# Patient Record
Sex: FEMALE | Race: WHITE | NOT HISPANIC OR LATINO | Employment: OTHER | URBAN - METROPOLITAN AREA
[De-identification: names, ages, dates, MRNs, and addresses within clinical notes are randomized per-mention and may not be internally consistent; named-entity substitution may affect disease eponyms.]

---

## 2017-02-02 PROBLEM — I50.9 ACUTE ON CHRONIC HEART FAILURE: Status: ACTIVE | Noted: 2017-02-02

## 2017-02-20 ENCOUNTER — HOSPITAL ENCOUNTER (OUTPATIENT)
Dept: RADIOLOGY | Facility: HOSPITAL | Age: 72
Discharge: HOME | End: 2017-02-20
Attending: INTERNAL MEDICINE
Payer: MEDICARE

## 2017-02-20 DIAGNOSIS — I48.0 PAROXYSMAL ATRIAL FIBRILLATION: ICD-10-CM

## 2017-02-20 PROCEDURE — 71020 XR CHEST PA AND LATERAL: CPT | Mod: 26,,, | Performed by: RADIOLOGY

## 2017-02-20 PROCEDURE — 71020 XR CHEST PA AND LATERAL: CPT | Mod: TC

## 2017-03-15 ENCOUNTER — HOSPITAL ENCOUNTER (INPATIENT)
Facility: HOSPITAL | Age: 72
LOS: 3 days | Discharge: HOME | DRG: 191 | End: 2017-03-18
Attending: EMERGENCY MEDICINE | Admitting: HOSPITALIST
Payer: MEDICARE

## 2017-03-15 DIAGNOSIS — I50.32 CHRONIC DIASTOLIC CHF (CONGESTIVE HEART FAILURE): ICD-10-CM

## 2017-03-15 DIAGNOSIS — I10 ESSENTIAL HYPERTENSION: Chronic | ICD-10-CM

## 2017-03-15 DIAGNOSIS — J44.1 COPD WITH ACUTE EXACERBATION: ICD-10-CM

## 2017-03-15 DIAGNOSIS — J44.1 ACUTE EXACERBATION OF CHRONIC OBSTRUCTIVE PULMONARY DISEASE (COPD): Primary | ICD-10-CM

## 2017-03-15 DIAGNOSIS — E03.9 HYPOTHYROIDISM, UNSPECIFIED TYPE: Chronic | ICD-10-CM

## 2017-03-15 DIAGNOSIS — K21.9 GASTROESOPHAGEAL REFLUX DISEASE, ESOPHAGITIS PRESENCE NOT SPECIFIED: Chronic | ICD-10-CM

## 2017-03-15 DIAGNOSIS — J96.11 CHRONIC RESPIRATORY FAILURE WITH HYPOXIA: Chronic | ICD-10-CM

## 2017-03-15 DIAGNOSIS — E78.5 HYPERLIPIDEMIA, UNSPECIFIED HYPERLIPIDEMIA TYPE: Chronic | ICD-10-CM

## 2017-03-15 DIAGNOSIS — J44.9 CHRONIC OBSTRUCTIVE PULMONARY DISEASE, UNSPECIFIED COPD TYPE: Chronic | ICD-10-CM

## 2017-03-15 DIAGNOSIS — R06.03 RESPIRATORY DISTRESS: ICD-10-CM

## 2017-03-15 PROBLEM — I50.9 ACUTE ON CHRONIC HEART FAILURE: Status: RESOLVED | Noted: 2017-02-02 | Resolved: 2017-03-15

## 2017-03-15 LAB
ALBUMIN SERPL BCP-MCNC: 3.5 G/DL
ALP SERPL-CCNC: 162 U/L
ALT SERPL W/O P-5'-P-CCNC: 17 U/L
ANION GAP SERPL CALC-SCNC: 10 MMOL/L
APTT BLDCRRT: 27.5 SEC
AST SERPL-CCNC: 12 U/L
BASOPHILS # BLD AUTO: 0.01 K/UL
BASOPHILS NFR BLD: 0.2 %
BILIRUB SERPL-MCNC: 1 MG/DL
BNP SERPL-MCNC: 32 PG/ML
BUN SERPL-MCNC: 16 MG/DL
CALCIUM SERPL-MCNC: 9.5 MG/DL
CHLORIDE SERPL-SCNC: 101 MMOL/L
CO2 SERPL-SCNC: 29 MMOL/L
CREAT SERPL-MCNC: 0.9 MG/DL
DIFFERENTIAL METHOD: ABNORMAL
EOSINOPHIL # BLD AUTO: 0 K/UL
EOSINOPHIL NFR BLD: 0 %
ERYTHROCYTE [DISTWIDTH] IN BLOOD BY AUTOMATED COUNT: 15.8 %
EST. GFR  (AFRICAN AMERICAN): >60 ML/MIN/1.73 M^2
EST. GFR  (NON AFRICAN AMERICAN): >60 ML/MIN/1.73 M^2
GLUCOSE SERPL-MCNC: 151 MG/DL
HCT VFR BLD AUTO: 41.3 %
HGB BLD-MCNC: 13.6 G/DL
INR PPP: 1
LYMPHOCYTES # BLD AUTO: 0.3 K/UL
LYMPHOCYTES NFR BLD: 4.8 %
MAGNESIUM SERPL-MCNC: 2.2 MG/DL
MCH RBC QN AUTO: 29.3 PG
MCHC RBC AUTO-ENTMCNC: 32.9 %
MCV RBC AUTO: 89 FL
MONOCYTES # BLD AUTO: 0.1 K/UL
MONOCYTES NFR BLD: 1.5 %
NEUTROPHILS # BLD AUTO: 5 K/UL
NEUTROPHILS NFR BLD: 93.1 %
PLATELET # BLD AUTO: 175 K/UL
PMV BLD AUTO: 12 FL
POTASSIUM SERPL-SCNC: 3.5 MMOL/L
PROT SERPL-MCNC: 6.8 G/DL
PROTHROMBIN TIME: 10.9 SEC
RBC # BLD AUTO: 4.64 M/UL
SODIUM SERPL-SCNC: 140 MMOL/L
TROPONIN I SERPL DL<=0.01 NG/ML-MCNC: 0.01 NG/ML
TROPONIN I SERPL DL<=0.01 NG/ML-MCNC: 0.01 NG/ML
WBC # BLD AUTO: 5.39 K/UL

## 2017-03-15 PROCEDURE — 83880 ASSAY OF NATRIURETIC PEPTIDE: CPT

## 2017-03-15 PROCEDURE — 36415 COLL VENOUS BLD VENIPUNCTURE: CPT

## 2017-03-15 PROCEDURE — 94640 AIRWAY INHALATION TREATMENT: CPT

## 2017-03-15 PROCEDURE — 63600175 PHARM REV CODE 636 W HCPCS: Performed by: EMERGENCY MEDICINE

## 2017-03-15 PROCEDURE — 80053 COMPREHEN METABOLIC PANEL: CPT

## 2017-03-15 PROCEDURE — 99285 EMERGENCY DEPT VISIT HI MDM: CPT

## 2017-03-15 PROCEDURE — 25000242 PHARM REV CODE 250 ALT 637 W/ HCPCS: Performed by: EMERGENCY MEDICINE

## 2017-03-15 PROCEDURE — 27000221 HC OXYGEN, UP TO 24 HOURS

## 2017-03-15 PROCEDURE — 25000003 PHARM REV CODE 250: Performed by: EMERGENCY MEDICINE

## 2017-03-15 PROCEDURE — 93005 ELECTROCARDIOGRAM TRACING: CPT

## 2017-03-15 PROCEDURE — 25000003 PHARM REV CODE 250: Performed by: INTERNAL MEDICINE

## 2017-03-15 PROCEDURE — 85730 THROMBOPLASTIN TIME PARTIAL: CPT

## 2017-03-15 PROCEDURE — 85025 COMPLETE CBC W/AUTO DIFF WBC: CPT

## 2017-03-15 PROCEDURE — 83735 ASSAY OF MAGNESIUM: CPT

## 2017-03-15 PROCEDURE — 12000002 HC ACUTE/MED SURGE SEMI-PRIVATE ROOM

## 2017-03-15 PROCEDURE — 84484 ASSAY OF TROPONIN QUANT: CPT

## 2017-03-15 PROCEDURE — 84484 ASSAY OF TROPONIN QUANT: CPT | Mod: 91

## 2017-03-15 PROCEDURE — 85610 PROTHROMBIN TIME: CPT

## 2017-03-15 PROCEDURE — 94761 N-INVAS EAR/PLS OXIMETRY MLT: CPT

## 2017-03-15 RX ORDER — BUMETANIDE 1 MG/1
1 TABLET ORAL 2 TIMES DAILY
Status: DISCONTINUED | OUTPATIENT
Start: 2017-03-15 | End: 2017-03-18 | Stop reason: HOSPADM

## 2017-03-15 RX ORDER — METOPROLOL TARTRATE 25 MG/1
25 TABLET, FILM COATED ORAL 2 TIMES DAILY
Status: DISCONTINUED | OUTPATIENT
Start: 2017-03-15 | End: 2017-03-15

## 2017-03-15 RX ORDER — METHYLPREDNISOLONE SODIUM SUCCINATE 125 MG/2ML
125 INJECTION INTRAMUSCULAR; INTRAVENOUS EVERY 8 HOURS
Status: DISCONTINUED | OUTPATIENT
Start: 2017-03-15 | End: 2017-03-16

## 2017-03-15 RX ORDER — IPRATROPIUM BROMIDE AND ALBUTEROL SULFATE 2.5; .5 MG/3ML; MG/3ML
3 SOLUTION RESPIRATORY (INHALATION) EVERY 6 HOURS PRN
Status: DISCONTINUED | OUTPATIENT
Start: 2017-03-15 | End: 2017-03-18 | Stop reason: HOSPADM

## 2017-03-15 RX ORDER — POLYETHYLENE GLYCOL 3350 17 G/17G
17 POWDER, FOR SOLUTION ORAL DAILY
Status: DISCONTINUED | OUTPATIENT
Start: 2017-03-16 | End: 2017-03-18 | Stop reason: HOSPADM

## 2017-03-15 RX ORDER — MOXIFLOXACIN HYDROCHLORIDE 400 MG/250ML
400 INJECTION, SOLUTION INTRAVENOUS
Status: DISCONTINUED | OUTPATIENT
Start: 2017-03-15 | End: 2017-03-17

## 2017-03-15 RX ORDER — ONDANSETRON 2 MG/ML
4 INJECTION INTRAMUSCULAR; INTRAVENOUS EVERY 12 HOURS PRN
Status: DISCONTINUED | OUTPATIENT
Start: 2017-03-15 | End: 2017-03-15

## 2017-03-15 RX ORDER — HYDROCODONE BITARTRATE AND ACETAMINOPHEN 5; 325 MG/1; MG/1
1 TABLET ORAL EVERY 4 HOURS PRN
Status: DISCONTINUED | OUTPATIENT
Start: 2017-03-15 | End: 2017-03-15

## 2017-03-15 RX ORDER — ALBUTEROL SULFATE 2.5 MG/.5ML
5 SOLUTION RESPIRATORY (INHALATION)
Status: COMPLETED | OUTPATIENT
Start: 2017-03-15 | End: 2017-03-15

## 2017-03-15 RX ORDER — FAMOTIDINE 20 MG/1
20 TABLET, FILM COATED ORAL 2 TIMES DAILY
Status: DISCONTINUED | OUTPATIENT
Start: 2017-03-15 | End: 2017-03-18 | Stop reason: HOSPADM

## 2017-03-15 RX ORDER — ISOSORBIDE DINITRATE 10 MG/1
30 TABLET ORAL DAILY
COMMUNITY

## 2017-03-15 RX ORDER — ROSUVASTATIN CALCIUM 10 MG/1
20 TABLET, COATED ORAL DAILY
Status: DISCONTINUED | OUTPATIENT
Start: 2017-03-16 | End: 2017-03-18 | Stop reason: HOSPADM

## 2017-03-15 RX ORDER — CLONIDINE HYDROCHLORIDE 0.1 MG/1
0.1 TABLET ORAL 3 TIMES DAILY PRN
Status: DISCONTINUED | OUTPATIENT
Start: 2017-03-15 | End: 2017-03-18 | Stop reason: HOSPADM

## 2017-03-15 RX ORDER — METHYLPREDNISOLONE SODIUM SUCCINATE 125 MG/2ML
125 INJECTION INTRAMUSCULAR; INTRAVENOUS EVERY 6 HOURS
Status: DISCONTINUED | OUTPATIENT
Start: 2017-03-16 | End: 2017-03-15

## 2017-03-15 RX ORDER — MECLIZINE HYDROCHLORIDE 25 MG/1
25 TABLET ORAL 3 TIMES DAILY PRN
Status: DISCONTINUED | OUTPATIENT
Start: 2017-03-15 | End: 2017-03-18 | Stop reason: HOSPADM

## 2017-03-15 RX ORDER — ENOXAPARIN SODIUM 100 MG/ML
40 INJECTION SUBCUTANEOUS EVERY 24 HOURS
Status: DISCONTINUED | OUTPATIENT
Start: 2017-03-16 | End: 2017-03-18 | Stop reason: HOSPADM

## 2017-03-15 RX ORDER — DOXYCYCLINE HYCLATE 100 MG
100 TABLET ORAL 2 TIMES DAILY
Status: ON HOLD | COMMUNITY
End: 2017-03-18 | Stop reason: HOSPADM

## 2017-03-15 RX ORDER — METOPROLOL TARTRATE 25 MG/1
12.5 TABLET ORAL 2 TIMES DAILY
Status: DISCONTINUED | OUTPATIENT
Start: 2017-03-16 | End: 2017-03-18 | Stop reason: HOSPADM

## 2017-03-15 RX ORDER — IPRATROPIUM BROMIDE AND ALBUTEROL SULFATE 2.5; .5 MG/3ML; MG/3ML
3 SOLUTION RESPIRATORY (INHALATION) EVERY 4 HOURS
Status: DISCONTINUED | OUTPATIENT
Start: 2017-03-15 | End: 2017-03-16

## 2017-03-15 RX ORDER — LEVOTHYROXINE SODIUM 50 UG/1
50 TABLET ORAL
Status: DISCONTINUED | OUTPATIENT
Start: 2017-03-16 | End: 2017-03-18 | Stop reason: HOSPADM

## 2017-03-15 RX ORDER — MONTELUKAST SODIUM 10 MG/1
10 TABLET ORAL DAILY
Status: DISCONTINUED | OUTPATIENT
Start: 2017-03-16 | End: 2017-03-18 | Stop reason: HOSPADM

## 2017-03-15 RX ORDER — ACETAMINOPHEN 500 MG
500 TABLET ORAL EVERY 6 HOURS PRN
Status: DISCONTINUED | OUTPATIENT
Start: 2017-03-15 | End: 2017-03-18 | Stop reason: HOSPADM

## 2017-03-15 RX ORDER — RAMELTEON 8 MG/1
8 TABLET ORAL NIGHTLY PRN
Status: DISCONTINUED | OUTPATIENT
Start: 2017-03-15 | End: 2017-03-18 | Stop reason: HOSPADM

## 2017-03-15 RX ADMIN — MOXIFLOXACIN HYDROCHLORIDE 400 MG: 400 INJECTION, SOLUTION INTRAVENOUS at 09:03

## 2017-03-15 RX ADMIN — ALBUTEROL SULFATE 5 MG: 2.5 SOLUTION RESPIRATORY (INHALATION) at 02:03

## 2017-03-15 RX ADMIN — BUMETANIDE 1 MG: 1 TABLET ORAL at 09:03

## 2017-03-15 RX ADMIN — FAMOTIDINE 20 MG: 20 TABLET, FILM COATED ORAL at 09:03

## 2017-03-15 RX ADMIN — METOPROLOL TARTRATE 25 MG: 25 TABLET ORAL at 09:03

## 2017-03-15 RX ADMIN — ISOSORBIDE DINITRATE 30 MG: 20 TABLET ORAL at 11:03

## 2017-03-15 NOTE — ED PROVIDER NOTES
"Encounter Date: 3/15/2017    SCRIBE #1 NOTE: I, Elliotthimanshu Hernandez, am scribing for, and in the presence of, Kamlesh Robles MD. Other sections scribed: HPI, ROS.       History     Chief Complaint   Patient presents with    Shortness of Breath     arrived via [facility], called to home for c/o SOB since approx 1 wk ago, reports worsening SOB on exertion, EMS reports on home oxygen     Cough     EMS reports pt c/o productive cough, yellow sputum     Review of patient's allergies indicates:   Allergen Reactions    Contrast media Other (See Comments)     Nephropathy      Potassium Swelling    Clindamycin Rash     HPI Comments: CC: Shortness of Breath  HPI: This 72 y.o. female with COPD, CHF, O2 dependence, HTN, hypothyroidism, GERD and Hx of A-Fib s/p ablation presents to the ED via EMS c/o worsening of her chronic SOB today. Pt states that she quickly becomes out of breath with exertion. She reports associated postnasal drip, infrequent cough (yellow sputum), and "tightness" to lower sternal area worse with deep breathes. Pt reports taking Albuterol and Duoneb treatments earlier today, but denies much improvement of her SOB with this. EMS reports initial SpO2 80% on 2L NC, rising to 97% once pt got nebulizer. Family states pt has been O2 dependent for the past 2-3 months, but pt has been on home O2 for much longer. Pt states that she was recently taken off of her Coumadin due ot a blood clot in her L eye. Pt states that Dr. Owens placed her on 3 day Prednisone regimen and Doxycyline during appointment yesterday. Pt denies fever, abdominal pain, chest pain, syncope.      The history is provided by the patient.     Past Medical History:   Diagnosis Date    Acute CHF     Asthma     COPD (chronic obstructive pulmonary disease)     Essential hypertension 8/20/2016    GERD (gastroesophageal reflux disease)     Hiatal hernia     Thyroid disease      Past Surgical History:   Procedure Laterality Date    " GALLBLADDER SURGERY      heart ablation       History reviewed. No pertinent family history.  Social History   Substance Use Topics    Smoking status: Former Smoker     Quit date: 8/8/2004    Smokeless tobacco: None    Alcohol use No     Review of Systems   Constitutional: Negative for chills and fever.   HENT: Positive for postnasal drip. Negative for congestion, rhinorrhea and sore throat.    Eyes: Negative for pain and visual disturbance.   Respiratory: Positive for cough (yellow sputum), chest tightness and shortness of breath.    Cardiovascular: Negative for chest pain.   Gastrointestinal: Negative for nausea and vomiting.   Genitourinary: Negative for difficulty urinating and dysuria.   Musculoskeletal: Negative for arthralgias and myalgias.   Skin: Negative for rash and wound.   Neurological: Negative for headaches.       Physical Exam   Initial Vitals   BP Pulse Resp Temp SpO2   03/15/17 1316 03/15/17 1316 03/15/17 1316 -- 03/15/17 1316   129/93 113 27  98 %     Physical Exam  The patient was examined specifically for the following:   General:No significant distress, Good color, Warm and dry. Head and neck:Scalp atraumatic, Neck supple. Neurological:Appropriate conversation, Gross motor deficits. Eyes:Conjugate gaze, Clear corneas. ENT: No epistaxis. Cardiac: Regular rate and rhythm, Grossly normal heart tones. Pulmonary: Wheezing, Rales. Gastrointestinal: Abdominal tenderness, Abdominal distention. Musculoskeletal: Extremity deformity, Apparent pain with range of motion of the joints. Skin: Rash.   The findings on examination were normal except for the following: The patient has distant breath sounds.  The heart rate is 113.  The patient's oxygen saturations are 94% at rest, in the stretcher, on 2 L nasal cannula.  I hear no leo wheezing or rales.  Patient's respiratory rate is 27.   ED Course   Critical Care  Date/Time: 3/16/2017 2:20 PM  Performed by: MAGDALENA DE LOS SANTOS  Authorized by: CLIFTON HILLMAN  LEOBARDO   Direct patient critical care time: 25 minutes  Additional history critical care time: 11 minutes  Ordering / reviewing critical care time: 9 minutes  Documentation critical care time: 17 minutes  Consulting other physicians critical care time: 5 minutes  Total critical care time (exclusive of procedural time) : 67 minutes  Critical care time was exclusive of separately billable procedures and treating other patients and teaching time.  Critical care was necessary to treat or prevent imminent or life-threatening deterioration of the following conditions: respiratory failure.  Critical care was time spent personally by me on the following activities: development of treatment plan with patient or surrogate, examination of patient, ordering and review of laboratory studies, re-evaluation of patient's condition, pulse oximetry, ordering and performing treatments and interventions, evaluation of patient's response to treatment, discussions with primary provider, review of old charts, ordering and review of radiographic studies and obtaining history from patient or surrogate.        Labs Reviewed   COMPREHENSIVE METABOLIC PANEL - Abnormal; Notable for the following:        Result Value    Glucose 151 (*)     Alkaline Phosphatase 162 (*)     All other components within normal limits   CBC W/ AUTO DIFFERENTIAL - Abnormal; Notable for the following:     RDW 15.8 (*)     Lymph # 0.3 (*)     Mono # 0.1 (*)     Gran% 93.1 (*)     Lymph% 4.8 (*)     Mono% 1.5 (*)     All other components within normal limits   PROTIME-INR   APTT   TROPONIN I   B-TYPE NATRIURETIC PEPTIDE   MAGNESIUM     EKG Readings: (Independently Interpreted)   This patient is in a sinus tachycardia with a heart rate of 111.  The DE QRS and QT intervals are normal.  There is no definite evidence of acute myocardial infarction or malignant arrhythmia.       X-Rays:   Independently Interpreted Readings:   Other Readings:  Chest x-ray fails to reveal evidence  of pulmonary edema pneumonia and pneumothorax.    Medical decision making: Given the above, this patient presented emergency room with a history of severe COPD.  Oxygen saturations on home 2 L nasal cannula are 93%.  When the patient exerts herself just a little bit, for instance to sit and urinate.  Oxygen saturations dropped to 88%.  The chest x-ray is negative for pneumothorax pneumonia pleural effusion.  Troponin and BNP are negative.  I doubt myocardial infarction.  The patient is using her oxygen much more than at baseline.  She is coughing up yellow sputum.  She is failing treatment with doxycycline and prednisone.  I'll admit her for IV steroids antibiotic therapy, oxygen therapy and consultation with pulmonology.  I will discuss this case with .              Scribe Attestation:   Scribe #1: I performed the above scribed service and the documentation accurately describes the services I performed. I attest to the accuracy of the note.    Attending Attestation:           Physician Attestation for Scribe:  Physician Attestation Statement for Scribe #1: I, Kamlesh Robles MD, reviewed documentation, as scribed by Elliott Hernandez in my presence, and it is both accurate and complete.                 ED Course     Clinical Impression:   The primary encounter diagnosis was Acute exacerbation of chronic obstructive pulmonary disease (COPD). A diagnosis of Respiratory distress was also pertinent to this visit.          Kamlesh Robles MD  03/16/17 [id number]

## 2017-03-15 NOTE — ED TRIAGE NOTES
Brought by EMS for SOB which has been worsening for the past week. On O2 at home 2LPM NC. EMS stated that her Sat was in the 80's when they picked her up. Currently 93% on 2 LPM. Leg cramps. Coughing up yellow sputum. AAO X3

## 2017-03-15 NOTE — IP AVS SNAPSHOT
[place] [address] [place] [address]  Phone: [phone]           Patient Discharge Instructions     Our goal is to set you up for success. This packet includes information on your condition, medications, and your home care. It will help you to care for yourself so you don't get sicker and need to go back to the hospital.     Please ask your nurse if you have any questions.        There are many details to remember when preparing to leave the hospital. Here is what you will need to do:    1. Take your medicine. If you are prescribed medications, review your Medication List in the following pages. You may have new medications to  at the pharmacy and others that you'll need to stop taking. Review the instructions for how and when to take your medications. Talk with your doctor or nurses if you are unsure of what to do.     2. Go to your follow-up appointments. Specific follow-up information is listed in the following pages. Your may be contacted by a transition nurse or clinical provider about future appointments. Be sure we have all of the phone numbers to reach you, if needed. Please contact your provider's office if you are unable to make an appointment.     3. Watch for warning signs. Your doctor or nurse will give you detailed warning signs to watch for and when to call for assistance. These instructions may also include educational information about your condition. If you experience any of warning signs to your health, call your doctor.               [facility]  Unless otherwise directed by your provider, please contact [facility], our nurse care line that is available for 24/7 assistance.     [phone] (toll-free)    Registered nurses in the [facility] provide clinical advisement, health education, appointment booking, and other advisory services.                    ** Verify the list of medication(s) below is accurate and up to date.  Carry this with you in case of emergency. If your medications have changed, please notify your healthcare provider.             Medication List      START taking these medications        Additional Info                      acetylcysteine 200 mg/ml (20%) 200 mg/mL (20 %) nebulizer solution   Commonly known as:  MUCOMYST   Quantity:  15 vial   Refills:  11   Dose:  2 mL    Last time this was given:  2 mLs on 3/18/2017  4:03 PM   Instructions:  Take 2 mLs by nebulization 3 (three) times daily as needed.     Begin Date    AM    Noon    PM    Bedtime       amoxicillin-clavulanate 875-125mg 875-125 mg per tablet   Commonly known as:  AUGMENTIN   Quantity:  14 tablet   Refills:  0   Dose:  1 tablet    Instructions:  Take 1 tablet by mouth 2 (two) times daily.     Begin Date    AM    Noon    PM    Bedtime       fluticasone 50 mcg/actuation nasal spray   Commonly known as:  FLONASE   Quantity:  3 g   Refills:  11   Dose:  2 spray    Last time this was given:  2 sprays on 3/18/2017 10:21 AM   Instructions:  2 sprays by Each Nare route once daily.     Begin Date    AM    Noon    PM    Bedtime         CONTINUE taking these medications        Additional Info                      ALBUTEROL INHL   Refills:  0    Instructions:  Inhale into the lungs.     Begin Date    AM    Noon    PM    Bedtime       albuterol-ipratropium 2.5mg-0.5mg/3mL 0.5 mg-3 mg(2.5 mg base)/3 mL nebulizer solution   Commonly known as:  DUO-NEB   Quantity:  3 vial   Refills:  12   Dose:  3 mL    Last time this was given:  3 mLs on 3/18/2017  4:03 PM   Instructions:  Take 3 mLs by nebulization every 4 (four) hours. Rescue     Begin Date    AM    Noon    PM    Bedtime       bumetanide 1 MG tablet   Commonly known as:  BUMEX   Refills:  0   Dose:  1 mg    Last time this was given:  1 mg on 3/18/2017 10:12 AM   Instructions:  Take 1 mg by mouth 2 (two) times daily.     Begin Date    AM    Noon    PM    Bedtime       isosorbide dinitrate 10 MG tablet   Commonly  known as:  ISORDIL   Refills:  0   Dose:  30 mg    Last time this was given:  30 mg on 3/17/2017  9:08 PM   Instructions:  Take 30 mg by mouth once daily.     Begin Date    AM    Noon    PM    Bedtime       levothyroxine 75 MCG tablet   Commonly known as:  SYNTHROID   Refills:  0   Dose:  50 mcg    Last time this was given:  50 mcg on 3/18/2017  6:01 AM   Instructions:  Take 50 mcg by mouth once daily.     Begin Date    AM    Noon    PM    Bedtime       meclizine 32 MG tablet   Commonly known as:  ANTIVERT   Refills:  0   Dose:  12.5 mg    Instructions:  Take 12.5 mg by mouth as needed.     Begin Date    AM    Noon    PM    Bedtime       metoprolol tartrate 25 MG tablet   Commonly known as:  LOPRESSOR   Refills:  0   Dose:  25 mg    Last time this was given:  12.5 mg on 3/18/2017 10:11 AM   Instructions:  Take 25 mg by mouth 2 (two) times daily.     Begin Date    AM    Noon    PM    Bedtime       montelukast 10 mg tablet   Commonly known as:  SINGULAIR   Quantity:  30 tablet   Refills:  3   Dose:  10 mg    Last time this was given:  10 mg on 3/18/2017 10:12 AM   Instructions:  Take 1 tablet (10 mg total) by mouth once daily.     Begin Date    AM    Noon    PM    Bedtime       predniSONE 20 MG tablet   Commonly known as:  DELTASONE   Quantity:  12 tablet   Refills:  0    Instructions:  Take 3 tabs for 2 days, then 2 tabs for 2 days, then 1 tab for 2 days, then stop.     Begin Date    AM    Noon    PM    Bedtime       ranitidine 300 MG capsule   Commonly known as:  ZANTAC   Refills:  0   Dose:  300 mg    Instructions:  Take 300 mg by mouth every evening.     Begin Date    AM    Noon    PM    Bedtime       rosuvastatin 10 MG tablet   Commonly known as:  CRESTOR   Refills:  0   Dose:  20 mg    Last time this was given:  20 mg on 3/18/2017 10:11 AM   Instructions:  Take 20 mg by mouth once daily.     Begin Date    AM    Noon    PM    Bedtime         STOP taking these medications     doxycycline 100 MG tablet   Commonly  known as:  VIBRA-TABS            Where to Get Your Medications      You can get these medications from any pharmacy     Bring a paper prescription for each of these medications     acetylcysteine 200 mg/ml (20%) 200 mg/mL (20 %) nebulizer solution    amoxicillin-clavulanate 875-125mg 875-125 mg per tablet    fluticasone 50 mcg/actuation nasal spray                  Please bring to all follow up appointments:    1. A copy of your discharge instructions.  2. All medicines you are currently taking in their original bottles.  3. Identification and insurance card.    Please arrive 15 minutes ahead of scheduled appointment time.    Please call 24 hours in advance if you must reschedule your appointment and/or time.        Follow-up Information     Schedule an appointment as soon as possible for a visit with Patrice Owens MD.    Specialty:  Internal Medicine    Why:  As needed    Contact information:    [address]  [facility] [address] [phone]          Go to David Mcclain MD.    Specialty:  Pulmonary Disease    Why:  as scheduled for PFTs    Contact information:    [address] [place] [address] [address]  [place] [address]  [phone]          Discharge Instructions     Future Orders    Activity as tolerated     Call MD for:  difficulty breathing or increased cough     Call MD for:  persistent dizziness, light-headedness, or visual disturbances     Call MD for:  temperature >100.4     Diet Cardiac         Primary Diagnosis     Your primary diagnosis was:  Chronic Bronchitis      Admission Information     Date & Time Provider Department CSN    3/15/2017  1:15 PM Karlie Andrade MD [facility] [id number]      Care Providers     Provider Role Specialty Primary office phone    Karlie Andrade MD Attending Provider Hospitalist [phone]      Important Medicare Message          Most Recent Value    Important Message from Medicare Regarding Discharge Appeal Rights  Given to patient/caregiver,  "Explained to patient/caregiver, Signed/date by patient/caregiver yes 03/17/2017 1149      Your Vitals Were     BP Pulse Temp Resp Height Weight    138/75 (BP Location: Right arm, Patient Position: Sitting, BP Method: Automatic) 97 98.3 °F (36.8 °C) (Oral) 20 5' 3" (1.6 m) 76.4 kg (168 lb 6.4 oz)    Last Period SpO2 BMI          (LMP Unknown) 98% 29.83 kg/m2        Recent Lab Values     No lab values to display.      Allergies as of 3/18/2017        Reactions    Contrast Media Other (See Comments)    Nephropathy    Potassium Swelling    Clindamycin Rash      Advance Directives     An advance directive is a document which, in the event you are no longer able to make decisions for yourself, tells your healthcare team what kind of treatment you do or do not want to receive, or who you would like to make those decisions for you.  If you do not currently have an advance directive, [facility] encourages you to create one.  For more information call:  [phone]), [phone] ([phone]),  or log on to [email or web address].        Language Assistance Services     ATTENTION: Language assistance services are available, free of charge. Please call [phone].      ATENCIÓN: Si habla español, tiene a gan disposición servicios gratuitos de asistencia lingüística. Llame al [phone].     [facility] Ý: N?u b?n nói Ti?ng Vi?t, có các d?ch v? h? tr? ngôn ng? mi?n phí dành cho b?n. G?i s? [phone].        Heart Failure Education       Heart Failure: Being Active  You have a condition called heart failure. Being active doesnt mean that you have to wear yourself out. Even a little movement each day helps to strengthen your heart. If you cant get out to exercise, you can do simple stretching and strengthening exercises at home. These are good ways to keep you well-conditioned and prevent you and your heart from becoming excessively weak.    Ideas to get you started  · Add a little movement to things you do " now. Walk to mail letters. Park your car at the far end of the parking lot and walk to the store. Walk up a flight of stairs instead of taking the elevator.  · Choose activities you enjoy. You might walk, swim, or ride an exercise bike. Things like gardening and washing the car count, too. Other possibilities include: washing dishes, walking the dog, walking around the mall, and doing aerobic activities with friends.  · Join a group exercise program at a [facility] or [facility], a senior center, or a community center. Or look into a hospital cardiac rehabilitation program. Ask your doctor if you qualify.  Tips to keep you going  · Get up and get dressed each day. Go to a coffee shop and read a newspaper or go somewhere that you'll be in the presence of other active people. Youll feel more like being active.  · Make a plan. Choose one or more activities that you enjoy and that you can easily do. Then plan to do at least one each day. You might write your plan on a calendar.  · Go with a friend or a group if you like company. This can help you feel supported and stay motivated, too.  · Plan social events that you enjoy. This will keep you mentally engaged as well as physically motivated to do things you find pleasure in.  For your safety  · Talk with your healthcare provider before starting an exercise program.  · Exercise indoors when its too hot or too cold outside, or when the air quality is poor. Try walking at a shopping mall.  · Wear socks and sturdy shoes to maintain your balance and prevent falls.  · Start slowly. Do a few minutes several times a day at first. Increase your time and speed little by little.  · Stop and rest whenever you feel tired or get short of breath.  · Dont push yourself on days when you dont feel well.  Date Last Reviewed: 3/20/2016  © 1233-6900 [employer]. [address] [place] [address], [place] [address]. All rights reserved. This information is not intended as a substitute for  professional medical care. Always follow your healthcare professional's instructions.              Heart Failure: Evaluating Your Heart  You have a condition called heart failure. To evaluate your condition, your doctor will examine you, ask questions, and do some tests. Along with looking for signs of heart failure, the doctor looks for any other health problems that may have led to heart failure. The results of your evaluation will help your doctor form a treatment plan.  Health history and physical exam  Your visit will start with a health history. Tell the doctor about any symptoms youve noticed and about all medicines you take. Then youll have a physical exam. This includes listening to your heartbeat and breathing. Youll also be checked for swelling (edema) in your legs and neck. When you have fluid buildup or fluid in the lungs, it may be called congestive heart failure.  Diagnosing heart failure     During an echocardiogram, sound waves bounce off the heart. These are converted into a picture on the screen.   The following may be done to help your doctor form a diagnosis:  · X-rays show the size and shape of your heart. These pictures can also show fluid in your lungs.  · An electrocardiogram (ECG or EKG) shows the pattern of your heartbeat. Small pads (electrodes) are placed on your chest, arms, and legs. Wires connect the pads to the ECG machine, which records your hearts electrical signals. This can give the doctor information about heart function.  · An echocardiogram uses ultrasound waves to show the structure and movement of your heart muscle. This shows how well the heart pumps. It also shows the thickness of the heart walls, and if the heart is enlarged. It is one of the most useful, non-invasive tests as it provides information about the heart's general function. This helps your doctor make treatment decisions.  · Lab tests evaluate small amounts of blood or urine for signs of problems. A BNP  lab test can help diagnose and evaluate heart failure. BNP stands for B-type natriuretic peptide. The ventricles secrete more BNP when heart failure worsens. Lab tests can also provide information about metabolic dysfunction or heart dysfunction.  Your treatment plan  Based on the results of your evaluation and tests, your doctor will develop a treatment plan. This plan is designed to relieve some of your heart failure symptoms and help make you more comfortable. Your treatment plan may include:  · Medicine to help your heart work better and improve your quality of life  · Changes in what you eat and drink to help prevent fluid from backing up in your body  · Daily monitoring of your weight and heart failure symptoms to see how well your treatment plan is working  · Exercise to help you stay healthy  · Help with quitting smoking  · Emotional and psychological support to help adjust to the changes  · Referrals to other specialists to make sure you are being treated comprehensively  Date Last Reviewed: 3/21/2016  © 5772-4405 [employer]. [address] [place] [address]. All rights reserved. This information is not intended as a substitute for professional medical care. Always follow your healthcare professional's instructions.              Heart Failure: Making Changes to Your Diet  You have a condition called heart failure. When you have heart failure, excess fluid is more likely to build up in your body because your heart isn't working well. This makes the heart work harder to pump blood. Fluid buildup causes symptoms such as shortness of breath and swelling (edema). This is often referred to as congestive heart failure or CHF. Controlling the amount of salt (sodium) you eat may help stop fluid from building up. Your doctor may also tell you to reduce the amount of fluid you drink.  Reading food labels    Your healthcare provider will tell you how much sodium you can eat each day. Read food  labels to keep track. Keep in mind that certain foods are high in salt. These include canned, frozen, and processed foods. Check the amount of sodium in each serving. Watch out for high-sodium ingredients. These include MSG (monosodium glutamate), baking soda, and sodium phosphate.   Eating less salt  Give yourself time to get used to eating less salt. It may take a little while. Here are some tips to help:  · Take the saltshaker off the table. Replace it with salt-free herb mixes and spices.  · Eat fresh or plain frozen vegetables. These have much less salt than canned vegetables.  · Choose low-sodium snacks like sodium-free pretzels, crackers, or air-popped popcorn.  · Dont add salt to your food when youre cooking. Instead, season your foods with pepper, lemon, garlic, or onion.  · When you eat out, ask that your food be cooked without added salt.  · Avoid eating fried foods as these often have a great deal of salt.  If youre told to limit fluids  You may need to limit how much fluid you have to help prevent swelling. This includes anything that is liquid at room temperature, such as ice cream and soup. If your doctor tells you to limit fluid, try these tips:  · Measure drinks in a measuring cup before you drink them. This will help you meet daily goals.  · Chill drinks to make them more refreshing.  · Suck on frozen lemon wedges to quench thirst.  · Only drink when youre thirsty.  · Chew sugarless gum or suck on hard candy to keep your mouth moist.  · Weigh yourself daily to know if your body's fluid content is rising.  My sodium goal  Your healthcare provider may give you a sodium goal to meet each day. This includes sodium found in food as well as salt that you add. My goal is to eat no more than ___________ mg of sodium per day.     When to call your doctor  Call your doctor right away if you have any symptoms of worsening heart failure. These can include:  · Sudden weight gain  · Increased swelling of  your legs or ankles  · Trouble breathing when youre resting or at night  · Increase in the number of pillows you have to sleep on  · Chest pain, pressure, discomfort, or pain in the jaw, neck, or back   Date Last Reviewed: 3/21/2016  © 3597-5896 [employer]. [address] [place] [address]. All rights reserved. This information is not intended as a substitute for professional medical care. Always follow your healthcare professional's instructions.              Heart Failure: Medicines to Help Your Heart    You have a condition called heart failure (also known as congestive heart failure, or CHF). Your doctor will likely prescribe medicines for heart failure and any underlying health problems you have. Most heart failure patients take one or more types of medicinen. Your healthcare provider will work to find the combination of medicines that works best for you.  Heart failure medicines  Here are the most common heart failure medicines:  · ACE inhibitors lower blood pressure and decrease strain on the heart. This makes it easier for the heart to pump. Angiotensin receptor blockers have similar effects. These are prescribed for some patients instead of ACE inhibitors.  · Beta-blockers relieve stress on the heart. They also improve symptoms. They may also improve the heart's pumping action over time.  · Diuretics (also called water pills) help rid your body of excess water. This can help rid your body of swelling (edema). Having less fluid to pump means your heart doesnt have to work as hard. Some diuretics make your body lose a mineral called potassium. Your doctor will tell you if you need to take supplements or eat more foods high in potassium.  · Digoxin helps your heart pump with more strength. This helps your heart pump more blood with each beat. So, more oxygen-rich blood travels to the rest of the body.  · Aldosterone antagonists help alter hormones and decrease strain on the  heart.  · Hydralazine and nitrates are two separate medicines used together to treat heart failure. They may come in one combination pill. They lower blood pressure and decrease how hard the heart has to pump.  Medicines for related conditions  Controlling other heart problems helps keep heart failure under control, too. Depending on other heart problems you have, medicines may be prescribed to:  · Lower blood pressure (antihypertensives).  · Lower cholesterol levels (statins).  · Prevent blood clots (anticoagulants or aspirin).  · Keep the heartbeat steady (antiarrhythmics).  Date Last Reviewed: 3/5/2016  © 5642-4641 [employer]. [address] [place] [address], [place] [address]. All rights reserved. This information is not intended as a substitute for professional medical care. Always follow your healthcare professional's instructions.              Heart Failure: Procedures That May Help    The heart is a muscle that pumps oxygen-rich blood to all parts of the body. When you have heart failure, the heart is not able to pump as well as it should. Blood and fluid may back up into the lungs (congestive heart failure), and some parts of the body dont get enough oxygen-rich blood to work normally. These problems lead to the symptoms of heart failure.     Certain procedures may help the heart pump better in some cases of heart failure. Some procedures are done to treat health problems that may have caused the heart failure such as coronary artery disease or heart rhythm problems. For more serious heart failure, other options are available.  Treating artery and valve problems  If you have coronary artery disease or valve disease, procedures may be done to improve blood flow. This helps the heart pump better, which can improve heart failure symptoms. First, your doctor may do a cardiac catheterization to help detect clogged blood vessels or valve damage. During this procedure, a  thin tube (catheter) in inserted  into a blood vessel and guided to the heart. There a dye is injected and a special type of X-ray (angiogram) is taken of the blood vessels. Procedures to open a blocked artery or fix damaged valves can also be done using catheterization.  · Angioplasty uses a balloon-tipped instrument at the end of the catheter. The balloon is inflated to widen the narrowed artery. In many cases, a stent is expanded to further support the narrowed artery. A stent is a metal mesh tube.  · Valve surgery repairs or replacement of faulty valves can also be done during catheterization so blood can flow properly through the chambers of the heart.  Bypass surgery is another option to help treat blocked arteries. It uses a healthy blood vessel from elsewhere in the body. The healthy blood vessel is attached above and below the blocked area so that blood can flow around the blocked artery.  Treating heart rhythm problems  A device may be placed in the chest to help a weak heart maintain a healthy, heartbeat so the heart can pump more effectively:  · Pacemaker. A pacemaker is an implanted device that regulates your heartbeat electronically. It monitors your heart's rhythm and generates a painless electric impulse that helps the heart beat in a regular rhythm. A pacemaker is programmed to meet your specific heart rhythm needs.  · Biventricular pacing/cardiac resynchronization therapy. A type of pacemaker that paces both pumping chambers of the heart at the same time to coordinate contractions and to improve the heart's function. Some people with heart failure are candidates for this therapy.  · Implantable cardioverter defibrillator. A device similar to a pacemaker that senses when the heart is beating too fast and delivers an electrical shock to convert the fast rhythm to a normal rhythm. This can be a life saving device.  In severe cases  In more serious cases of heart failure when other treatments no longer work, other options may  include:  · Ventricular assist devices (VADs). These are mechanical devices used to take over the pumping function for one or both of the heart's ventricles, or pumping chambers. A VAD may be necessary when heart failure progresses to the point that medicines and other treatments no longer help. In some cases, a VAD may be used as a bridge to transplant.  · Heart transplant. This is replacing the diseased heart with a healthy one from a donor. This is an option for a few people who are very sick. A heart transplant is very serious and not an option for all patients. Your doctor can tell you more.  Date Last Reviewed: 3/20/2016  © 3186-8088 [employer]. [address] [place] [address], [place] [address]. All rights reserved. This information is not intended as a substitute for professional medical care. Always follow your healthcare professional's instructions.              Heart Failure: Tracking Your Weight  You have a condition called heart failure. When you have heart failure, a sudden weight gain or a steady rise in weight is a warning sign that your body is retaining too much water and salt. This could mean your heart failure is getting worse. If left untreated, it can cause problems for your lungs and result in shortness of breath. Weighing yourself each day is the best way to know if youre retaining water. If your weight goes up quickly, call your doctor. You will be given instructions on how to get rid of the excess water. You will likely need medicines and to avoid salt. This will help your heart work better.  Call your doctor if you gain more than 2 pounds in 1 day, more than 5 pounds in 1 week, or whatever weight gain you were told to report by your doctor. This is often a sign of worsening heart failure and needs to be evaluated and treated. Your doctor will tell you what to do next.   Tips for weighing yourself    · Weigh yourself at the same time each morning, wearing the same clothes. Weigh  yourself after urinating and before eating.  · Use the same scale each day. Make sure the numbers are easy to read. Put the scale on a flat, hard surface -- not on a rug or carpet.  · Do not stop weighing yourself. If you forget one day, weigh again the next morning.  How to use your weight chart  · Keep your weight chart near the scale. Write your weight on the chart as soon as you get off the scale.  · Fill in the month and the start date on the chart. Then write down your weight each day. Your chart will look like this:    · If you miss a day, leave the space blank. Weigh yourself the next day and write your weight in the next space.  · Take your weight chart with you when you go to see your doctor.  Date Last Reviewed: 3/20/2016  © 7476-7839 [employer]. [address] [place] [address]. All rights reserved. This information is not intended as a substitute for professional medical care. Always follow your healthcare professional's instructions.              Heart Failure: Warning Signs of a Flare-Up  You have a condition called heart failure. Once you have heart failure, flare-ups can happen. Below are signs that can mean your heart failure is getting worse. If you notice any of these warning signs, call your healthcare provider.  Swelling    · Your feet, ankles, or lower legs get puffier.  · You notice skin changes on your lower legs.  · Your shoes feel too tight.  · Your clothes are tighter in the waist.  · You have trouble getting rings on or off your fingers.  Shortness of breath  · You have to breathe harder even when youre doing your normal activities or when youre resting.  · You are short of breath walking up stairs or even short distances.  · You wake up at night short of breath or coughing.  · You need to use more pillows or sit up to sleep.  · You wake up tired or restless.  Other warning signs  · You feel weaker, dizzy, or more tired.  · You have chest pain or changes in your  heartbeat.  · You have a cough that wont go away.  · You cant remember things or dont feel like eating.  Tracking your weight  Gaining weight is often the first warning sign that heart failure is getting worse. Gaining even a few pounds can be a sign that your body is retaining excess water and salt. Weighing yourself each day in the morning after you urinate and before you eat, is the best way to know if you're retaining water. Get a scale that is easy to read and make sure you wear the same clothes and use the same scale every time you weigh. Your healthcare provider will show you how to track your weight. Call your doctor if you gain more than 2 pounds in 1 day, 5 pounds in 1 week, or whatever weight gain you were told to report by your doctor. This is often a sign of worsening heart failure and needs to be evaluated and treated before it compromises your breathing. Your doctor will tell you what to do next.    Date Last Reviewed: 3/15/2016  © 5782-5507 [employer]. [address] [place] [address]. All rights reserved. This information is not intended as a substitute for professional medical care. Always follow your healthcare professional's instructions.              [facility] Sign-Up     Activating your [facility] account is as easy as 1-2-3!     1) Visit [email or web address], select Sign Up Now, enter this activation code and your date of birth, then select Next.  [email or web address]  Expires: 3/20/2017  3:32 PM      2) Create a username and password to use when you visit [facility] in the future and select a security question in case you lose your password and select Next.    3) Enter your e-mail address and click Sign Up!    Additional Information  If you have questions, please e-mail [email or web address] or call [phone] to talk to our [facility] staff. Remember, [facility] is NOT to be used for urgent needs. For medical emergencies, dial 911.          [facility]  complies with applicable Federal civil rights laws and does not discriminate on the basis of race, color, national origin, age, disability, or sex.

## 2017-03-15 NOTE — ED PROVIDER NOTES
"Encounter Date: 3/15/2017    SCRIBE #1 NOTE: I, Elliott Hernandez, am scribing for, and in the presence of, Kamlesh Robles MD. Other sections scribed: HPI, ROS.       History     Chief Complaint   Patient presents with    Shortness of Breath     arrived via [facility], called to home for c/o SOB since approx 1 wk ago, reports worsening SOB on exertion, EMS reports on home oxygen     Cough     EMS reports pt c/o productive cough, yellow sputum     Review of patient's allergies indicates:   Allergen Reactions    Contrast media Other (See Comments)     Nephropathy      Potassium Swelling    Clindamycin Rash     HPI Comments: CC: Shortness of Breath  HPI: This 72 y.o. female with COPD, CHF, O2 dependence, HTN, hypothyroidism, GERD and Hx of A-Fib s/p ablation presents to the ED via EMS c/o worsening of her chronic SOB today. Pt states that she quickly becomes out of breath with exertion. She reports associated postnasal drip, infrequent cough (yellow sputum), and "tightness" to lower sternal area worse with deep breathes. Pt reports taking Albuterol and Duoneb treatments earlier today, but denies much improvement of her SOB with this. EMS reports initial SpO2 80% on 2L NC, rising to 97% once pt got nebulizer. Family states pt has been O2 dependent for the past 2-3 months, but pt has been on home O2 for much longer. Pt states that she was recently taken off of her Coumadin due ot a blood clot in her L eye. Pt states that Dr. Owens placed her on 3 day Prednisone regimen and Doxycyline during appointment yesterday. Pt denies fever, abdominal pain, chest pain, syncope.      The history is provided by the patient and a relative.     Past Medical History:   Diagnosis Date    Acute CHF     Asthma     COPD (chronic obstructive pulmonary disease)     Essential hypertension 8/20/2016    GERD (gastroesophageal reflux disease)     Hiatal hernia     Thyroid disease      Past Surgical History:   Procedure Laterality " Date    GALLBLADDER SURGERY      heart ablation       History reviewed. No pertinent family history.  Social History   Substance Use Topics    Smoking status: Former Smoker     Quit date: 8/8/2004    Smokeless tobacco: None    Alcohol use No     Review of Systems   Constitutional: Negative for chills and fever.   HENT: Positive for postnasal drip. Negative for ear pain and sore throat.    Eyes: Negative for pain.   Respiratory: Positive for cough (yellow sputum), chest tightness and shortness of breath.    Cardiovascular: Negative for chest pain.   Gastrointestinal: Negative for abdominal pain, diarrhea, nausea and vomiting.   Genitourinary: Negative for difficulty urinating, dysuria and frequency.   Musculoskeletal: Negative for arthralgias and myalgias.   Skin: Negative for rash and wound.   Neurological: Negative for dizziness and syncope.       Physical Exam   Initial Vitals   BP Pulse Resp Temp SpO2   03/15/17 1316 03/15/17 1316 03/15/17 1316 03/15/17 1340 03/15/17 1316   129/93 113 27 98.3 °F (36.8 °C) 98 %     Physical Exam    ED Course   Procedures  Labs Reviewed - No data to display                     Scribe Attestation:   Scribe #1: I performed the above scribed service and the documentation accurately describes the services I performed. I attest to the accuracy of the note.    Attending Attestation:           Physician Attestation for Scribe:  Physician Attestation Statement for Scribe #1: I, Kamlesh Robles MD, reviewed documentation, as scribed by Elliott Hernandez in my presence, and it is both accurate and complete.                 ED Course     Clinical Impression:   There were no encounter diagnoses.

## 2017-03-15 NOTE — ED NOTES
Noted upon placing on bedpan by ER tech, increased SOB, work of breathing, oxygen sat's decreasing to 88%

## 2017-03-15 NOTE — ED NOTES
Daughter at bedside and pt and daughter both notified of transfer to [facility].

## 2017-03-16 PROBLEM — R06.03 RESPIRATORY DISTRESS: Status: ACTIVE | Noted: 2017-03-16

## 2017-03-16 LAB
ALBUMIN SERPL BCP-MCNC: 3.1 G/DL
ALP SERPL-CCNC: 141 U/L
ALT SERPL W/O P-5'-P-CCNC: 15 U/L
ANION GAP SERPL CALC-SCNC: 8 MMOL/L
AST SERPL-CCNC: 12 U/L
BASOPHILS # BLD AUTO: 0.01 K/UL
BASOPHILS NFR BLD: 0.2 %
BILIRUB SERPL-MCNC: 0.9 MG/DL
BUN SERPL-MCNC: 18 MG/DL
CALCIUM SERPL-MCNC: 9.5 MG/DL
CHLORIDE SERPL-SCNC: 101 MMOL/L
CO2 SERPL-SCNC: 33 MMOL/L
CREAT SERPL-MCNC: 0.9 MG/DL
DIFFERENTIAL METHOD: ABNORMAL
EOSINOPHIL # BLD AUTO: 0 K/UL
EOSINOPHIL NFR BLD: 0.2 %
ERYTHROCYTE [DISTWIDTH] IN BLOOD BY AUTOMATED COUNT: 15.8 %
EST. GFR  (AFRICAN AMERICAN): >60 ML/MIN/1.73 M^2
EST. GFR  (NON AFRICAN AMERICAN): >60 ML/MIN/1.73 M^2
GLUCOSE SERPL-MCNC: 98 MG/DL
HCT VFR BLD AUTO: 38.8 %
HGB BLD-MCNC: 12.7 G/DL
LYMPHOCYTES # BLD AUTO: 0.8 K/UL
LYMPHOCYTES NFR BLD: 12.9 %
MAGNESIUM SERPL-MCNC: 2.1 MG/DL
MCH RBC QN AUTO: 29.3 PG
MCHC RBC AUTO-ENTMCNC: 32.7 %
MCV RBC AUTO: 89 FL
MONOCYTES # BLD AUTO: 0.7 K/UL
MONOCYTES NFR BLD: 12.6 %
NEUTROPHILS # BLD AUTO: 4.3 K/UL
NEUTROPHILS NFR BLD: 73.8 %
PHOSPHATE SERPL-MCNC: 3.6 MG/DL
PLATELET # BLD AUTO: 180 K/UL
PMV BLD AUTO: 11.1 FL
POTASSIUM SERPL-SCNC: 3.5 MMOL/L
PROT SERPL-MCNC: 6.1 G/DL
RBC # BLD AUTO: 4.34 M/UL
SODIUM SERPL-SCNC: 142 MMOL/L
TROPONIN I SERPL DL<=0.01 NG/ML-MCNC: <0.006 NG/ML
WBC # BLD AUTO: 5.81 K/UL

## 2017-03-16 PROCEDURE — 84484 ASSAY OF TROPONIN QUANT: CPT

## 2017-03-16 PROCEDURE — 94664 DEMO&/EVAL PT USE INHALER: CPT

## 2017-03-16 PROCEDURE — 25000003 PHARM REV CODE 250: Performed by: INTERNAL MEDICINE

## 2017-03-16 PROCEDURE — 63600175 PHARM REV CODE 636 W HCPCS: Performed by: EMERGENCY MEDICINE

## 2017-03-16 PROCEDURE — 63600175 PHARM REV CODE 636 W HCPCS: Performed by: HOSPITALIST

## 2017-03-16 PROCEDURE — 27000221 HC OXYGEN, UP TO 24 HOURS

## 2017-03-16 PROCEDURE — 25000242 PHARM REV CODE 250 ALT 637 W/ HCPCS: Performed by: EMERGENCY MEDICINE

## 2017-03-16 PROCEDURE — 80053 COMPREHEN METABOLIC PANEL: CPT

## 2017-03-16 PROCEDURE — 25000003 PHARM REV CODE 250: Performed by: EMERGENCY MEDICINE

## 2017-03-16 PROCEDURE — 63600175 PHARM REV CODE 636 W HCPCS: Performed by: INTERNAL MEDICINE

## 2017-03-16 PROCEDURE — 85025 COMPLETE CBC W/AUTO DIFF WBC: CPT

## 2017-03-16 PROCEDURE — 94640 AIRWAY INHALATION TREATMENT: CPT

## 2017-03-16 PROCEDURE — 84100 ASSAY OF PHOSPHORUS: CPT

## 2017-03-16 PROCEDURE — 36415 COLL VENOUS BLD VENIPUNCTURE: CPT

## 2017-03-16 PROCEDURE — 12000002 HC ACUTE/MED SURGE SEMI-PRIVATE ROOM

## 2017-03-16 PROCEDURE — 83735 ASSAY OF MAGNESIUM: CPT

## 2017-03-16 RX ORDER — IPRATROPIUM BROMIDE AND ALBUTEROL SULFATE 2.5; .5 MG/3ML; MG/3ML
3 SOLUTION RESPIRATORY (INHALATION) EVERY 4 HOURS
Status: DISCONTINUED | OUTPATIENT
Start: 2017-03-16 | End: 2017-03-18 | Stop reason: HOSPADM

## 2017-03-16 RX ORDER — METHYLPREDNISOLONE SODIUM SUCCINATE 125 MG/2ML
80 INJECTION INTRAMUSCULAR; INTRAVENOUS EVERY 8 HOURS
Status: DISCONTINUED | OUTPATIENT
Start: 2017-03-16 | End: 2017-03-17

## 2017-03-16 RX ADMIN — IPRATROPIUM BROMIDE AND ALBUTEROL SULFATE 3 ML: .5; 3 SOLUTION RESPIRATORY (INHALATION) at 03:03

## 2017-03-16 RX ADMIN — Medication 12.5 MG: at 08:03

## 2017-03-16 RX ADMIN — IPRATROPIUM BROMIDE AND ALBUTEROL SULFATE 3 ML: .5; 3 SOLUTION RESPIRATORY (INHALATION) at 08:03

## 2017-03-16 RX ADMIN — BUMETANIDE 1 MG: 1 TABLET ORAL at 08:03

## 2017-03-16 RX ADMIN — IPRATROPIUM BROMIDE AND ALBUTEROL SULFATE 3 ML: .5; 3 SOLUTION RESPIRATORY (INHALATION) at 01:03

## 2017-03-16 RX ADMIN — FAMOTIDINE 20 MG: 20 TABLET, FILM COATED ORAL at 08:03

## 2017-03-16 RX ADMIN — MONTELUKAST SODIUM 10 MG: 10 TABLET, FILM COATED ORAL at 08:03

## 2017-03-16 RX ADMIN — MOXIFLOXACIN HYDROCHLORIDE 400 MG: 400 INJECTION, SOLUTION INTRAVENOUS at 08:03

## 2017-03-16 RX ADMIN — METHYLPREDNISOLONE SODIUM SUCCINATE 125 MG: 125 INJECTION, POWDER, FOR SOLUTION INTRAMUSCULAR; INTRAVENOUS at 02:03

## 2017-03-16 RX ADMIN — LEVOTHYROXINE SODIUM 50 MCG: 50 TABLET ORAL at 06:03

## 2017-03-16 RX ADMIN — METHYLPREDNISOLONE SODIUM SUCCINATE 125 MG: 125 INJECTION, POWDER, FOR SOLUTION INTRAMUSCULAR; INTRAVENOUS at 06:03

## 2017-03-16 RX ADMIN — ROSUVASTATIN CALCIUM 20 MG: 10 TABLET, FILM COATED ORAL at 08:03

## 2017-03-16 RX ADMIN — IPRATROPIUM BROMIDE AND ALBUTEROL SULFATE 3 ML: .5; 3 SOLUTION RESPIRATORY (INHALATION) at 04:03

## 2017-03-16 RX ADMIN — IPRATROPIUM BROMIDE AND ALBUTEROL SULFATE 3 ML: .5; 3 SOLUTION RESPIRATORY (INHALATION) at 11:03

## 2017-03-16 RX ADMIN — METHYLPREDNISOLONE SODIUM SUCCINATE 80 MG: 125 INJECTION, POWDER, LYOPHILIZED, FOR SOLUTION INTRAMUSCULAR; INTRAVENOUS at 10:03

## 2017-03-16 RX ADMIN — METHYLPREDNISOLONE SODIUM SUCCINATE 80 MG: 125 INJECTION, POWDER, LYOPHILIZED, FOR SOLUTION INTRAMUSCULAR; INTRAVENOUS at 08:03

## 2017-03-16 RX ADMIN — ISOSORBIDE DINITRATE 30 MG: 20 TABLET ORAL at 08:03

## 2017-03-16 RX ADMIN — ACETAMINOPHEN 500 MG: 500 TABLET ORAL at 12:03

## 2017-03-16 RX ADMIN — ENOXAPARIN SODIUM 40 MG: 100 INJECTION SUBCUTANEOUS at 11:03

## 2017-03-16 RX ADMIN — POLYETHYLENE GLYCOL 3350 17 G: 17 POWDER, FOR SOLUTION ORAL at 08:03

## 2017-03-16 NOTE — H&P
[facility] Hospital Medicine  History & Physical    Patient Name: Luz Baldwin  MRN: [id number]  Admission Date: 3/15/2017  Attending Physician: Karlie Andrade MD   Primary Care Provider: Patrice Owens MD         Patient information was obtained from patient.     Subjective:     Principal Problem:COPD with acute exacerbation    Chief Complaint: Shortness of breath this morning.    HPI: Mrs. Luz Baldwin is a 72 y.o. female with essential hypertension, hyperlipidemia (.0 Feb 2017), COPD, chronic respiratory failure with hypoxia, and hypothyroidism (TSH 4.618 Sept 2014) who presents to [facility] ED with complaints of worsening baseline dyspnea today.  She dyspnea is mainly on exertion and has severely limited her ability to ambulate.  She was previously able to ambulate but says it's been worse since her last hospitalization in Feb 2017.  The dyspnea is associated with a cough that is productive of light yellow sputum.  She has not had any fevers, chills, night sweats, weight loss, sick contacts, recent travel, chest pain, pleurisy, palpitations, hemoptysis, nor any lower extremity pain or swelling.  She also has had some wheezing and has been using her inhalers with good effect.      Chart Review:  Previous Hospitalizations  Date Hospital Diagnosis   Feb 2017 [facility] COPD exacerbation    Aug 2016 [facility] COPD exacerbation      Outpatient Follow-Up  Date of Visit Physician Service   May 2015 Radha Parsons MD Primary Care   Sept 2014 Les Flanagan MD Vascular Surgery      Past Medical History:   Diagnosis Date    Acute CHF     Asthma     COPD (chronic obstructive pulmonary disease)     Essential hypertension 8/20/2016    GERD (gastroesophageal reflux disease)     Hiatal hernia     Thyroid disease        Past Surgical History:   Procedure Laterality Date    GALLBLADDER SURGERY      heart ablation         Review of patient's allergies indicates:   Allergen Reactions     Contrast media Other (See Comments)     Nephropathy      Potassium Swelling    Clindamycin Rash       No current facility-administered medications on file prior to encounter.      Current Outpatient Prescriptions on File Prior to Encounter   Medication Sig    ALBUTEROL INHL Inhale into the lungs.    albuterol-ipratropium 2.5mg-0.5mg/3mL (DUO-NEB) 0.5 mg-3 mg(2.5 mg base)/3 mL nebulizer solution Take 3 mLs by nebulization every 4 (four) hours. Rescue    bumetanide (BUMEX) 1 MG tablet Take 1 mg by mouth 2 (two) times daily.     levothyroxine (SYNTHROID) 75 MCG tablet Take 50 mcg by mouth once daily.     meclizine (ANTIVERT) 32 MG tablet Take 12.5 mg by mouth as needed.    metoprolol tartrate (LOPRESSOR) 25 MG tablet Take 25 mg by mouth 2 (two) times daily.    montelukast (SINGULAIR) 10 mg tablet Take 1 tablet (10 mg total) by mouth once daily.    predniSONE (DELTASONE) 20 MG tablet Take 3 tabs for 2 days, then 2 tabs for 2 days, then 1 tab for 2 days, then stop.    rosuvastatin (CRESTOR) 10 MG tablet Take 20 mg by mouth once daily.      Family History     None        Social History Main Topics    Smoking status: Former Smoker     Quit date: 8/8/2004    Smokeless tobacco: Not on file    Alcohol use No    Drug use: Not on file    Sexual activity: Not on file     Review of Systems   Constitutional: Negative for activity change, appetite change, chills, diaphoresis, fatigue, fever and unexpected weight change.   HENT: Negative.    Eyes: Negative.    Respiratory: Positive for cough, shortness of breath and wheezing. Negative for chest tightness.    Cardiovascular: Negative for chest pain, palpitations and leg swelling.   Gastrointestinal: Negative for abdominal distention, abdominal pain, blood in stool, constipation, diarrhea, nausea and vomiting.   Endocrine: Negative.    Genitourinary: Negative for dysuria and hematuria.   Musculoskeletal: Negative.    Neurological: Negative for dizziness, seizures,  syncope, weakness and light-headedness.   Psychiatric/Behavioral: Negative.      Objective:     Vital Signs (Most Recent):  Temp: 97.5 °F (36.4 °C) (03/15/17 2046)  Pulse: 87 (03/15/17 2046)  Resp: 20 (03/15/17 2046)  BP: (!) 151/67 (03/15/17 2046)  SpO2: 96 % (03/15/17 2046) Vital Signs (24h Range):  Temp:  [97 °F (36.1 °C)-98.5 °F (36.9 °C)] 97.5 °F (36.4 °C)  Pulse:  [] 87  Resp:  [19-27] 20  SpO2:  [90 %-98 %] 96 %  BP: (110-151)/(56-93) 151/67     Weight: 74 kg (163 lb 2.3 oz)  Body mass index is 28.9 kg/(m^2).    Physical Exam   Constitutional: She is oriented to person, place, and time. She appears well-developed and well-nourished. She appears distressed.   HENT:   Head: Normocephalic and atraumatic.   Right Ear: External ear normal.   Left Ear: External ear normal.   Nose: Nose normal.   Eyes: Right eye exhibits no discharge. Left eye exhibits no discharge.   Neck: Normal range of motion.   Cardiovascular: Normal rate, regular rhythm, normal heart sounds and intact distal pulses.  Exam reveals no gallop and no friction rub.    No murmur heard.  Pulmonary/Chest:   Mildly increased work of breathing with diffuse and bilateral expiratory wheezing   Abdominal: Soft. Bowel sounds are normal. She exhibits no distension. There is no tenderness. There is no rebound and no guarding.   Musculoskeletal: Normal range of motion. She exhibits no edema.   Neurological: She is alert and oriented to person, place, and time.   Skin: Skin is warm and dry. She is not diaphoretic.   Psychiatric: She has a normal mood and affect. Her behavior is normal. Judgment and thought content normal.   Nursing note and vitals reviewed.       Significant Labs: All pertinent labs within the past 24 hours have been reviewed.    Significant Imaging: I have reviewed and interpreted all pertinent imaging results/findings within the past 24 hours.    Assessment/Plan:     * COPD with acute exacerbation  Patient was noted with an oxygen  saturation of 80% on nasal cannula at 2 liters/minutes upon arrival of EMS at her home, which improved after a nebulized breathing treatment.  Her oxygen saturation here was as low as 90% on her home supplemental oxygen therapy.  I have reviewed the chest X-ray and it reveals a right pleural effusion but otherwise without infiltrates.  She appears to be improved at this time.  She has failed outpatient therapy.  Will provide frequent nebulized ALEJANDRO/LAMA; intravenous corticosteroids; and empiric antibiotics.  Will continue supplemental oxygen.    Hypothyroidism  Poorly-controlled; will continue patient's home regimen of levothyroxine.    Essential hypertension  Patient's blood pressure is well-controlled; will continue home regimen of bumetanide, isosorbide, and metoprolol, and provide as-needed clonidine.    Hyperlipidemia  Poorly-controlled; will continue patient's home regimen of rosuvastatin.    Chronic respiratory failure with hypoxia  As addressed above.    COPD (chronic obstructive pulmonary disease)  As addressed above.    GERD (gastroesophageal reflux disease)  Will substitute her home regimen of ranitidine for famotidine while she is inpatient.    VTE Risk Mitigation         Ordered     enoxaparin injection 40 mg  Daily     Route:  Subcutaneous        03/15/17 2110     Medium Risk of VTE  Once      03/15/17 2110            Total time spent on case: 45 minutes.        Angle Villa M.D.  Staff Nocturnist  Department of Hospital Medicine  [facility]  Pager: [phone]

## 2017-03-16 NOTE — PLAN OF CARE
03/16/17 1556   Discharge Assessment   Assessment Type Discharge Planning Assessment   Confirmed/corrected address and phone number on facesheet? Yes   Assessment information obtained from? Patient   Prior to hospitilization cognitive status: Alert/Oriented   Prior to hospitalization functional status: Independent   Current cognitive status: Alert/Oriented   Current Functional Status: Independent   Arrived From home or self-care   Lives With alone   Able to Return to Prior Arrangements yes   Is patient able to care for self after discharge? Yes   How many people do you have in your home that can help with your care after discharge? 2   Who are your caregiver(s) and their phone number(s)? Danica- [phone] and Fabio- [phone]   Patient's perception of discharge disposition home or selfcare   Readmission Within The Last 30 Days no previous admission in last 30 days   Patient currently being followed by outpatient case management? No   Patient currently receives home health services? No   Does the patient currently use HME? No   Patient currently receives private duty nursing? N/A   Patient currently receives any other outside agency services? No   Equipment Currently Used at Home none   Do you have any problems affording any of your prescribed medications? No   Is the patient taking medications as prescribed? yes   Do you have any financial concerns preventing you from receiving the healthcare you need? No   Does the patient have transportation to healthcare appointments? Yes   Transportation Available family or friend will provide;car   On Dialysis? No   Does the patient receive services at the Coumadin Clinic? No   Are there any open cases? No   Discharge Plan A Home with family   Discharge Plan B Home with family   Patient/Family In Agreement With Plan yes       [id number] [place] - [address]  [address] [address]  Phone: [phone] Fax: [phone]

## 2017-03-16 NOTE — PROGRESS NOTES
Pt  Care assumed, pt lying in supine position with hob elevated , talking with visitor at bedside. Pt aaox4 with no c/o of pain at this time. Pt sob with movement. o2 in use at 2 liters n/c. Pt breath sounds diminished to anterior and posterior field with a cough noted,.saline lock to left forearm site tender per pt.michael restart.pt personal items are within reach and call bell for nurse at bedside.

## 2017-03-16 NOTE — NURSING
1845- patient arrived to floor from emergency room by stretcher . Telemetry scoping sinus rhythm . Patient denies pain just feels short of breath right now. wearing oxygen at 2 liters nasal canula . Skin intact. No cyanosis.     1930 assessment completed. Reviewed plan of care with patient. No request or concerns patient reported she does have a lung doctor dr sinha she was scheduled to see this doctor this month. i did inform her she has a pulmonary consult on this admission. Iv site clean dry and intact and vitals wdl.     2015- report on patient given to ruby stephen rn on patients progress and updated hand off report sheet given to. No events on the end of this shift.

## 2017-03-16 NOTE — PLAN OF CARE
Problem: Patient Care Overview  Goal: Plan of Care Review  Outcome: Ongoing (interventions implemented as appropriate)  Patient remains on nasal cannula 2lpm. Aerosol treatment given as ordered.

## 2017-03-16 NOTE — ASSESSMENT & PLAN NOTE
Patient's blood pressure is well-controlled; will continue home regimen of bumetanide, isosorbide, and metoprolol, and provide as-needed clonidine.

## 2017-03-16 NOTE — PLAN OF CARE
"Problem: Breathing Pattern Ineffective (Adult)  Intervention: Optimize Oxygenation/Ventilation/Perfusion    03/16/17 0946   Respiratory Interventions   Airway/Ventilation Management airway patency maintained;pulmonary hygiene promoted;calming measures promoted   Positioning   Head of Bed (HOB) HOB at 45 degrees       Intervention: Minimize Oxygen Consumption/Demand    03/16/17 0946   Activity   Activity Type activity adjusted per tolerance;activity clustered for rest period;bedrest with commode;sitting, edge of bed       Intervention: Monitor/Manage Contributing Psychosocial Factors    03/16/17 0946   Coping/Psychosocial Interventions   Supportive Measures active listening utilized           Comments:   PT remains sob with activity. sao2 in the 90"s on 2 liters of oxygen. Pt with an occasional cough productive and non productive. Pt sounds form diminished to wheezing and coarse. solumederol and nebulizer rx administered as ordered.  "

## 2017-03-16 NOTE — ASSESSMENT & PLAN NOTE
Patient was noted with an oxygen saturation of 80% on nasal cannula at 2 liters/minutes upon arrival of EMS at her home, which improved after a nebulized breathing treatment.  Her oxygen saturation here was as low as 90% on her home supplemental oxygen therapy.  I have reviewed the chest X-ray and it reveals a right pleural effusion but otherwise without infiltrates.  She appears to be improved at this time.  She has failed outpatient therapy.  Will provide frequent nebulized ALEJANDRO/LAMA; intravenous corticosteroids; and empiric antibiotics.  Will continue supplemental oxygen.

## 2017-03-16 NOTE — SUBJECTIVE & OBJECTIVE
Past Medical History:   Diagnosis Date    Acute CHF     Asthma     COPD (chronic obstructive pulmonary disease)     Essential hypertension 8/20/2016    GERD (gastroesophageal reflux disease)     Hiatal hernia     Thyroid disease        Past Surgical History:   Procedure Laterality Date    GALLBLADDER SURGERY      heart ablation         Review of patient's allergies indicates:   Allergen Reactions    Contrast media Other (See Comments)     Nephropathy      Potassium Swelling    Clindamycin Rash       No current facility-administered medications on file prior to encounter.      Current Outpatient Prescriptions on File Prior to Encounter   Medication Sig    ALBUTEROL INHL Inhale into the lungs.    albuterol-ipratropium 2.5mg-0.5mg/3mL (DUO-NEB) 0.5 mg-3 mg(2.5 mg base)/3 mL nebulizer solution Take 3 mLs by nebulization every 4 (four) hours. Rescue    bumetanide (BUMEX) 1 MG tablet Take 1 mg by mouth 2 (two) times daily.     levothyroxine (SYNTHROID) 75 MCG tablet Take 50 mcg by mouth once daily.     meclizine (ANTIVERT) 32 MG tablet Take 12.5 mg by mouth as needed.    metoprolol tartrate (LOPRESSOR) 25 MG tablet Take 25 mg by mouth 2 (two) times daily.    montelukast (SINGULAIR) 10 mg tablet Take 1 tablet (10 mg total) by mouth once daily.    predniSONE (DELTASONE) 20 MG tablet Take 3 tabs for 2 days, then 2 tabs for 2 days, then 1 tab for 2 days, then stop.    rosuvastatin (CRESTOR) 10 MG tablet Take 20 mg by mouth once daily.      Family History     None        Social History Main Topics    Smoking status: Former Smoker     Quit date: 8/8/2004    Smokeless tobacco: Not on file    Alcohol use No    Drug use: Not on file    Sexual activity: Not on file     Review of Systems   Constitutional: Negative for activity change, appetite change, chills, diaphoresis, fatigue, fever and unexpected weight change.   HENT: Negative.    Eyes: Negative.    Respiratory: Positive for cough, shortness of breath  and wheezing. Negative for chest tightness.    Cardiovascular: Negative for chest pain, palpitations and leg swelling.   Gastrointestinal: Negative for abdominal distention, abdominal pain, blood in stool, constipation, diarrhea, nausea and vomiting.   Endocrine: Negative.    Genitourinary: Negative for dysuria and hematuria.   Musculoskeletal: Negative.    Neurological: Negative for dizziness, seizures, syncope, weakness and light-headedness.   Psychiatric/Behavioral: Negative.      Objective:     Vital Signs (Most Recent):  Temp: 97.5 °F (36.4 °C) (03/15/17 2046)  Pulse: 87 (03/15/17 2046)  Resp: 20 (03/15/17 2046)  BP: (!) 151/67 (03/15/17 2046)  SpO2: 96 % (03/15/17 2046) Vital Signs (24h Range):  Temp:  [97 °F (36.1 °C)-98.5 °F (36.9 °C)] 97.5 °F (36.4 °C)  Pulse:  [] 87  Resp:  [19-27] 20  SpO2:  [90 %-98 %] 96 %  BP: (110-151)/(56-93) 151/67     Weight: 74 kg (163 lb 2.3 oz)  Body mass index is 28.9 kg/(m^2).    Physical Exam   Constitutional: She is oriented to person, place, and time. She appears well-developed and well-nourished. She appears distressed.   HENT:   Head: Normocephalic and atraumatic.   Right Ear: External ear normal.   Left Ear: External ear normal.   Nose: Nose normal.   Eyes: Right eye exhibits no discharge. Left eye exhibits no discharge.   Neck: Normal range of motion.   Cardiovascular: Normal rate, regular rhythm, normal heart sounds and intact distal pulses.  Exam reveals no gallop and no friction rub.    No murmur heard.  Pulmonary/Chest:   Mildly increased work of breathing with diffuse and bilateral expiratory wheezing   Abdominal: Soft. Bowel sounds are normal. She exhibits no distension. There is no tenderness. There is no rebound and no guarding.   Musculoskeletal: Normal range of motion. She exhibits no edema.   Neurological: She is alert and oriented to person, place, and time.   Skin: Skin is warm and dry. She is not diaphoretic.   Psychiatric: She has a normal mood and  affect. Her behavior is normal. Judgment and thought content normal.   Nursing note and vitals reviewed.       Significant Labs: All pertinent labs within the past 24 hours have been reviewed.    Significant Imaging: I have reviewed and interpreted all pertinent imaging results/findings within the past 24 hours.

## 2017-03-17 PROCEDURE — 25000003 PHARM REV CODE 250: Performed by: HOSPITALIST

## 2017-03-17 PROCEDURE — 94664 DEMO&/EVAL PT USE INHALER: CPT

## 2017-03-17 PROCEDURE — 94761 N-INVAS EAR/PLS OXIMETRY MLT: CPT

## 2017-03-17 PROCEDURE — 25000003 PHARM REV CODE 250: Performed by: EMERGENCY MEDICINE

## 2017-03-17 PROCEDURE — 63600175 PHARM REV CODE 636 W HCPCS: Performed by: HOSPITALIST

## 2017-03-17 PROCEDURE — 94640 AIRWAY INHALATION TREATMENT: CPT

## 2017-03-17 PROCEDURE — 25000003 PHARM REV CODE 250: Performed by: INTERNAL MEDICINE

## 2017-03-17 PROCEDURE — 63600175 PHARM REV CODE 636 W HCPCS: Performed by: INTERNAL MEDICINE

## 2017-03-17 PROCEDURE — 27000221 HC OXYGEN, UP TO 24 HOURS

## 2017-03-17 PROCEDURE — 25000242 PHARM REV CODE 250 ALT 637 W/ HCPCS: Performed by: EMERGENCY MEDICINE

## 2017-03-17 PROCEDURE — 12000002 HC ACUTE/MED SURGE SEMI-PRIVATE ROOM

## 2017-03-17 RX ORDER — ACETYLCYSTEINE 200 MG/ML
2 SOLUTION ORAL; RESPIRATORY (INHALATION) 3 TIMES DAILY
Status: DISCONTINUED | OUTPATIENT
Start: 2017-03-17 | End: 2017-03-18 | Stop reason: HOSPADM

## 2017-03-17 RX ORDER — MOXIFLOXACIN HYDROCHLORIDE 400 MG/1
400 TABLET ORAL DAILY
Status: DISCONTINUED | OUTPATIENT
Start: 2017-03-17 | End: 2017-03-18 | Stop reason: HOSPADM

## 2017-03-17 RX ORDER — METHYLPREDNISOLONE SODIUM SUCCINATE 125 MG/2ML
40 INJECTION INTRAMUSCULAR; INTRAVENOUS EVERY 8 HOURS
Status: COMPLETED | OUTPATIENT
Start: 2017-03-17 | End: 2017-03-18

## 2017-03-17 RX ADMIN — IPRATROPIUM BROMIDE AND ALBUTEROL SULFATE 3 ML: .5; 3 SOLUTION RESPIRATORY (INHALATION) at 11:03

## 2017-03-17 RX ADMIN — ACETYLCYSTEINE 2 ML: 200 INHALANT RESPIRATORY (INHALATION) at 07:03

## 2017-03-17 RX ADMIN — MOXIFLOXACIN HYDROCHLORIDE 400 MG: 400 TABLET, FILM COATED ORAL at 09:03

## 2017-03-17 RX ADMIN — BUMETANIDE 1 MG: 1 TABLET ORAL at 09:03

## 2017-03-17 RX ADMIN — Medication 12.5 MG: at 08:03

## 2017-03-17 RX ADMIN — ENOXAPARIN SODIUM 40 MG: 100 INJECTION SUBCUTANEOUS at 11:03

## 2017-03-17 RX ADMIN — FAMOTIDINE 20 MG: 20 TABLET, FILM COATED ORAL at 08:03

## 2017-03-17 RX ADMIN — METHYLPREDNISOLONE SODIUM SUCCINATE 80 MG: 125 INJECTION, POWDER, LYOPHILIZED, FOR SOLUTION INTRAMUSCULAR; INTRAVENOUS at 01:03

## 2017-03-17 RX ADMIN — ISOSORBIDE DINITRATE 30 MG: 20 TABLET ORAL at 09:03

## 2017-03-17 RX ADMIN — IPRATROPIUM BROMIDE AND ALBUTEROL SULFATE 3 ML: .5; 3 SOLUTION RESPIRATORY (INHALATION) at 07:03

## 2017-03-17 RX ADMIN — METHYLPREDNISOLONE SODIUM SUCCINATE 40 MG: 125 INJECTION, POWDER, FOR SOLUTION INTRAMUSCULAR; INTRAVENOUS at 09:03

## 2017-03-17 RX ADMIN — IPRATROPIUM BROMIDE AND ALBUTEROL SULFATE 3 ML: .5; 3 SOLUTION RESPIRATORY (INHALATION) at 08:03

## 2017-03-17 RX ADMIN — POLYETHYLENE GLYCOL 3350 17 G: 17 POWDER, FOR SOLUTION ORAL at 08:03

## 2017-03-17 RX ADMIN — IPRATROPIUM BROMIDE AND ALBUTEROL SULFATE 3 ML: .5; 3 SOLUTION RESPIRATORY (INHALATION) at 04:03

## 2017-03-17 RX ADMIN — BUMETANIDE 1 MG: 1 TABLET ORAL at 08:03

## 2017-03-17 RX ADMIN — ROSUVASTATIN CALCIUM 20 MG: 10 TABLET, FILM COATED ORAL at 08:03

## 2017-03-17 RX ADMIN — Medication 12.5 MG: at 09:03

## 2017-03-17 RX ADMIN — FAMOTIDINE 20 MG: 20 TABLET, FILM COATED ORAL at 09:03

## 2017-03-17 RX ADMIN — METHYLPREDNISOLONE SODIUM SUCCINATE 80 MG: 125 INJECTION, POWDER, LYOPHILIZED, FOR SOLUTION INTRAMUSCULAR; INTRAVENOUS at 05:03

## 2017-03-17 RX ADMIN — IPRATROPIUM BROMIDE AND ALBUTEROL SULFATE 3 ML: .5; 3 SOLUTION RESPIRATORY (INHALATION) at 03:03

## 2017-03-17 RX ADMIN — MONTELUKAST SODIUM 10 MG: 10 TABLET, FILM COATED ORAL at 08:03

## 2017-03-17 NOTE — PROGRESS NOTES
[facility] Medicine  Progress Note    Patient Name: Luz Baldwin  MRN: [id number]  Patient Class: IP- Inpatient   Admission Date: 3/15/2017  Length of Stay: 1 days  Attending Physician: Karlie Andrade MD  Primary Care Provider: Patrice Owens MD        Subjective:     Principal Problem:COPD with acute exacerbation    HPI:  Mrs. Luz Baldwin is a 72 y.o. female with essential hypertension, hyperlipidemia (.0 Feb 2017), COPD, chronic respiratory failure with hypoxia, and hypothyroidism (TSH 4.618 Sept 2014) who presents to [facility] ED with complaints of worsening baseline dyspnea today.  She dyspnea is mainly on exertion and has severely limited her ability to ambulate.  She was previously able to ambulate but says it's been worse since her last hospitalization in Feb 2017.  The dyspnea is associated with a cough that is productive of light yellow sputum.  She has not had any fevers, chills, night sweats, weight loss, sick contacts, recent travel, chest pain, pleurisy, palpitations, hemoptysis, nor any lower extremity pain or swelling.  She also has had some wheezing and has been using her inhalers with good effect.      Hospital Course:  Pt admitted with COPD exacerbation on IV steroids and weaning oxygen. She is on 2 liters NC at home and using more continuously. Sinus congestion and was started on antibiotic prior to admission. + CONNELLY.    Interval History: pt c/o CONNELLY    Review of Systems   Constitutional: Negative for activity change, appetite change, chills, diaphoresis, fatigue, fever and unexpected weight change.   HENT: Negative.    Eyes: Negative.    Respiratory: Positive for cough, shortness of breath and wheezing. Negative for chest tightness.    Cardiovascular: Negative for chest pain, palpitations and leg swelling.   Gastrointestinal: Negative for abdominal distention, abdominal pain, blood in stool, constipation, diarrhea, nausea and vomiting.    Endocrine: Negative.    Genitourinary: Negative for dysuria and hematuria.   Musculoskeletal: Negative.    Neurological: Negative for dizziness, seizures, syncope, weakness and light-headedness.   Psychiatric/Behavioral: Negative.      Objective:     Vital Signs (Most Recent):  Temp: 98.4 °F (36.9 °C) (03/16/17 1900)  Pulse: 98 (03/16/17 2010)  Resp: 18 (03/16/17 2010)  BP: 121/68 (03/16/17 1900)  SpO2: 95 % (03/16/17 2010) Vital Signs (24h Range):  Temp:  [97.6 °F (36.4 °C)-99.1 °F (37.3 °C)] 98.4 °F (36.9 °C)  Pulse:  [] 98  Resp:  [0-23] 18  SpO2:  [93 %-96 %] 95 %  BP: (109-132)/(61-68) 121/68     Weight: 75.1 kg (165 lb 9.1 oz)  Body mass index is 29.33 kg/(m^2).    Intake/Output Summary (Last 24 hours) at 03/16/17 2207  Last data filed at 03/16/17 1800   Gross per 24 hour   Intake              480 ml   Output              900 ml   Net             -420 ml      Physical Exam   Constitutional: She is oriented to person, place, and time. She appears well-developed and well-nourished. She appears distressed.   HENT:   Head: Normocephalic and atraumatic.   Right Ear: External ear normal.   Left Ear: External ear normal.   Nose: Nose normal.   Eyes: Right eye exhibits no discharge. Left eye exhibits no discharge.   Neck: Normal range of motion.   Cardiovascular: Normal rate, regular rhythm, normal heart sounds and intact distal pulses.  Exam reveals no gallop and no friction rub.    No murmur heard.  Pulmonary/Chest:   Mildly increased work of breathing with diffuse and bilateral expiratory wheezing   Abdominal: Soft. Bowel sounds are normal. She exhibits no distension. There is no tenderness. There is no rebound and no guarding.   Musculoskeletal: Normal range of motion. She exhibits no edema.   Neurological: She is alert and oriented to person, place, and time.   Skin: Skin is warm and dry. She is not diaphoretic.   Psychiatric: She has a normal mood and affect. Her behavior is normal. Judgment and thought  content normal.   Nursing note and vitals reviewed.      Significant Labs:   BMP:   Recent Labs  Lab 03/16/17  0257   GLU 98      K 3.5      CO2 33*   BUN 18   CREATININE 0.9   CALCIUM 9.5   MG 2.1     CBC:   Recent Labs  Lab 03/15/17  1513 03/16/17  0257   WBC 5.39 5.81   HGB 13.6 12.7   HCT 41.3 38.8    180       Significant Imaging: I have reviewed all pertinent imaging results/findings within the past 24 hours.    Assessment/Plan:      * COPD with acute exacerbation  Patient was noted with an oxygen saturation of 80% on nasal cannula at 2 liters/minutes upon arrival of EMS at her home, which improved after a nebulized breathing treatment.  Her oxygen saturation here was as low as 90% on her home supplemental oxygen therapy.  I have reviewed the chest X-ray and it reveals a right pleural effusion but otherwise without infiltrates.  She appears to be improved at this time.  She has failed outpatient therapy.  Will provide frequent nebulized ALEJANDRO/LAMA; intravenous corticosteroids; and empiric antibiotics.  Will continue supplemental oxygen.    Hypothyroidism  Poorly-controlled; will continue patient's home regimen of levothyroxine.    Essential hypertension  Patient's blood pressure is well-controlled; will continue home regimen of bumetanide, isosorbide, and metoprolol, and provide as-needed clonidine.    Hyperlipidemia  Poorly-controlled; will continue patient's home regimen of rosuvastatin.    Chronic respiratory failure with hypoxia  As addressed above.    COPD (chronic obstructive pulmonary disease)  As addressed above.    GERD (gastroesophageal reflux disease)  Will substitute her home regimen of ranitidine for famotidine while she is inpatient.    Respiratory distress  Improving       VTE Risk Mitigation         Ordered     enoxaparin injection 40 mg  Daily     Route:  Subcutaneous        03/15/17 2110     Medium Risk of VTE  Once      03/15/17 2110          Karlie Andrade MD  Department  [facility]   [facility]

## 2017-03-17 NOTE — ASSESSMENT & PLAN NOTE
Patient was noted with an oxygen saturation of 80% on nasal cannula at 2 liters/minutes upon arrival of EMS at her home, which improved after a nebulized breathing treatment.  Her oxygen saturation here was as low as 90% on her home supplemental oxygen therapy.  I have reviewed the chest X-ray and it reveals a right pleural effusion but otherwise without infiltrates.  She appears to be improved at this time.  She has failed outpatient therapy.    Will continue on oral steroid taper, nebs and followup with pulmonology for PFTS

## 2017-03-17 NOTE — NURSING
Received report from SCARLETT Luna.    Evaluated general patient appearance/condition. Patient resting quietly, easily aroused per verbal stimuli. Shift assessment initiated. Patient awake and alert with family at bedside. Patient IV is clean dry and intact. NC on patient, communication board up to date, bed in lowest position, and call bell in reach. No apparent distress noted at this time.

## 2017-03-17 NOTE — PLAN OF CARE
Problem: Fall Risk (Adult)  Intervention: Monitor/Assist with Self Care    17   Activity   Activity Assistance Provided independent   Daily Care Interventions   Self-Care Promotion independence encouraged   Functional Level Current   Ambulation 2 - assistive person   Transferring 2 - assistive person   Toileting 0 - independent   Bathing 0 - independent   Dressing 0 - independent   Eating 0 - independent   Communication 0 - understands/communicates without difficulty   Swallowing 0 - swallows foods/liquids without difficulty       Intervention: Reduce Risk/Promote Restraint Free Environment    17   Safety Interventions   Environmental Safety Modification clutter free environment maintained   Safety Interventions   Safety Precautions emergency equipment at bedside   Prevent  Drop/Fall   Safety/Security Measures bed alarm set       Intervention: Review Medications/Identify Contributors to Fall Risk    17   Safety Interventions   Medication Review/Management medications reviewed       Intervention: Patient Rounds    17   Safety Interventions   Patient Rounds clutter free environment maintained         17   Safety Interventions   Patient Rounds clutter free environment maintained       Intervention: Safety Promotion/Fall Prevention    17   Safety Interventions   Safety Promotion/Fall Prevention bed alarm set;lighting adjusted       Intervention: Safety Precautions    17   Safety Interventions   Safety Precautions emergency equipment at bedside         Goal: Identify Related Risk Factors and Signs and Symptoms  Related risk factors and signs and symptoms are identified upon initiation of Human Response Clinical Practice Guideline (CPG)   Outcome: Ongoing (interventions implemented as appropriate)    17   Fall Risk   Related Risk Factors (Fall Risk) age-related changes;slipper/uneven surfaces   Signs and Symptoms (Fall Risk)  presence of risk factors       Goal: Absence of Falls  Patient will demonstrate the desired outcomes by discharge/transition of care.   Outcome: Ongoing (interventions implemented as appropriate)    03/17/17 0553   Fall Risk (Adult)   Absence of Falls making progress toward outcome         Problem: Patient Care Overview  Goal: Plan of Care Review  Outcome: Ongoing (interventions implemented as appropriate)    03/17/17 0553   Coping/Psychosocial   Plan Of Care Reviewed With patient       Goal: Discharge Needs Assessment  Outcome: Ongoing (interventions implemented as appropriate)    03/17/17 0553   Living Environment   Able to Return to Prior Arrangements yes   OTHER   Is patient able to care for self after discharge? Yes   If YES, was patient admitted for the same reason? No   Discharge Needs Assessment   How many people do you have in your home that can help with your care after discharge? 2   Activity/Self Care ROS   Equipment Currently Used at Home none   Living Environment   Transportation Available family or friend will provide   Social Work Plan   Patient/Family In Agreement With Plan yes       Goal: Interdisciplinary Rounds/Family Conf  Outcome: Ongoing (interventions implemented as appropriate)    03/17/17 0553   Interdisciplinary Rounds/Family Conf   Participants family         Problem: Breathing Pattern Ineffective (Adult)  Intervention: Optimize Oxygenation/Ventilation/Perfusion    03/17/17 0553   Respiratory Interventions   Airway/Ventilation Management calming measures promoted   Positioning   Head of Bed (HOB) HOB at 30 degrees       Intervention: Minimize Oxygen Consumption/Demand    03/17/17 0553   Coping/Psychosocial Interventions   Environmental Support calm environment promoted   Activity   Activity Type activity minimized   Pain/Comfort/Sleep Interventions   Sleep/Rest Enhancement awakenings minimized       Intervention: Monitor/Manage Contributing Psychosocial Factors    03/17/17 0553    Coping/Psychosocial Interventions   Supportive Measures active listening utilized   Psychosocial Support   Family/Support System Care involvement promoted         Goal: Identify Related Risk Factors and Signs and Symptoms  Related risk factors and signs and symptoms are identified upon initiation of Human Response Clinical Practice Guideline (CPG)   Outcome: Ongoing (interventions implemented as appropriate)    03/17/17 0553   Breathing Pattern Ineffective   Related Risk Factors (Breathing Pattern Ineffective) fatigue   Signs and Symptoms (Breathing Pattern Ineffective) restlessness       Goal: Effective Oxygenation/Ventilation  Patient will demonstrate the desired outcomes by discharge/transition of care.   Outcome: Ongoing (interventions implemented as appropriate)    03/17/17 0553   Breathing Pattern Ineffective (Adult)   Effective Oxygenation/Ventilation making progress toward outcome       Goal: Anxiety/Fear Reduction  Patient will demonstrate the desired outcomes by discharge/transition of care.   Outcome: Ongoing (interventions implemented as appropriate)    03/17/17 0553   Breathing Pattern Ineffective (Adult)   Anxiety/Fear Reduction making progress toward outcome         Problem: Pressure Ulcer Risk (Chris Scale) (Adult,Obstetrics,Pediatric)  Intervention: Promote/Optimize Nutrition    03/17/17 0553   Nutrition Interventions   Oral Nutrition Promotion medicated       Intervention: Prevent/Manage Excess Moisture    03/17/17 0553   Hygiene Care   Perineal Care absorbent pad changed   Bathing/Skin Care linen changed   Skin Interventions   Skin Protection adhesive use limited       Intervention: Maintain Head of Bed Elevation Less Than 30 Degrees as Tolerated    03/17/17 0553   Positioning   Head of Bed (HOB) HOB at 30 degrees       Intervention: Prevent/Minimize Sheer/Friction Injuries    03/17/17 0553   Positioning   Positioning/Transfer Devices pillows   Skin Interventions   Pressure Reduction Devices  positioning supports utilized   Pressure Reduction Techniques frequent weight shift encouraged       Intervention: Turn/Reposition Often    03/17/17 0553   Positioning   Body Position positioned/repositioned independently   Skin Interventions   Pressure Reduction Techniques frequent weight shift encouraged         Goal: Identify Related Risk Factors and Signs and Symptoms  Related risk factors and signs and symptoms are identified upon initiation of Human Response Clinical Practice Guideline (CPG)   Outcome: Ongoing (interventions implemented as appropriate)    03/17/17 0553   Pressure Ulcer Risk (Chris Scale)   Related Risk Factors (Pressure Ulcer Risk (Chris Scale)) hypothermia/hyperthermia;infection       Goal: Skin Integrity  Patient will demonstrate the desired outcomes by discharge/transition of care.   Outcome: Ongoing (interventions implemented as appropriate)    03/17/17 0553   Pressure Ulcer Risk (Chris Scale) (Adult,Obstetrics,Pediatric)   Skin Integrity making progress toward outcome

## 2017-03-17 NOTE — SUBJECTIVE & OBJECTIVE
Interval History: pt c/o CONNELYL    Review of Systems   Constitutional: Negative for activity change, appetite change, chills, diaphoresis, fatigue, fever and unexpected weight change.   HENT: Negative.    Eyes: Negative.    Respiratory: Positive for cough, shortness of breath and wheezing. Negative for chest tightness.    Cardiovascular: Negative for chest pain, palpitations and leg swelling.   Gastrointestinal: Negative for abdominal distention, abdominal pain, blood in stool, constipation, diarrhea, nausea and vomiting.   Endocrine: Negative.    Genitourinary: Negative for dysuria and hematuria.   Musculoskeletal: Negative.    Neurological: Negative for dizziness, seizures, syncope, weakness and light-headedness.   Psychiatric/Behavioral: Negative.      Objective:     Vital Signs (Most Recent):  Temp: 98.4 °F (36.9 °C) (03/16/17 1900)  Pulse: 98 (03/16/17 2010)  Resp: 18 (03/16/17 2010)  BP: 121/68 (03/16/17 1900)  SpO2: 95 % (03/16/17 2010) Vital Signs (24h Range):  Temp:  [97.6 °F (36.4 °C)-99.1 °F (37.3 °C)] 98.4 °F (36.9 °C)  Pulse:  [] 98  Resp:  [0-23] 18  SpO2:  [93 %-96 %] 95 %  BP: (109-132)/(61-68) 121/68     Weight: 75.1 kg (165 lb 9.1 oz)  Body mass index is 29.33 kg/(m^2).    Intake/Output Summary (Last 24 hours) at 03/16/17 2207  Last data filed at 03/16/17 1800   Gross per 24 hour   Intake              480 ml   Output              900 ml   Net             -420 ml      Physical Exam   Constitutional: She is oriented to person, place, and time. She appears well-developed and well-nourished. She appears distressed.   HENT:   Head: Normocephalic and atraumatic.   Right Ear: External ear normal.   Left Ear: External ear normal.   Nose: Nose normal.   Eyes: Right eye exhibits no discharge. Left eye exhibits no discharge.   Neck: Normal range of motion.   Cardiovascular: Normal rate, regular rhythm, normal heart sounds and intact distal pulses.  Exam reveals no gallop and no friction rub.    No murmur  heard.  Pulmonary/Chest:   Mildly increased work of breathing with diffuse and bilateral expiratory wheezing   Abdominal: Soft. Bowel sounds are normal. She exhibits no distension. There is no tenderness. There is no rebound and no guarding.   Musculoskeletal: Normal range of motion. She exhibits no edema.   Neurological: She is alert and oriented to person, place, and time.   Skin: Skin is warm and dry. She is not diaphoretic.   Psychiatric: She has a normal mood and affect. Her behavior is normal. Judgment and thought content normal.   Nursing note and vitals reviewed.      Significant Labs:   BMP:   Recent Labs  Lab 03/16/17  0257   GLU 98      K 3.5      CO2 33*   BUN 18   CREATININE 0.9   CALCIUM 9.5   MG 2.1     CBC:   Recent Labs  Lab 03/15/17  1513 03/16/17  0257   WBC 5.39 5.81   HGB 13.6 12.7   HCT 41.3 38.8    180       Significant Imaging: I have reviewed all pertinent imaging results/findings within the past 24 hours.

## 2017-03-17 NOTE — PROGRESS NOTES
Attempted to call PCP office for 3rd time to schedule follow up appointment, unable to do so at this time as no one answered phone and rang till went to a busy signal. Pt will have to schedule her own follow up appointment

## 2017-03-17 NOTE — ASSESSMENT & PLAN NOTE
Patient's blood pressure is well-controlled; will continue home regimen of bumetanide, isosorbide, and metoprolol

## 2017-03-17 NOTE — SUBJECTIVE & OBJECTIVE
Interval History: pt reports cough and starting to bring more mucous up, nasal sinuses less congested     Review of Systems   Constitutional: Negative for activity change, appetite change, chills, diaphoresis, fatigue, fever and unexpected weight change.   HENT: Negative.    Eyes: Negative.    Respiratory: Positive for cough, shortness of breath and wheezing. Negative for chest tightness.    Cardiovascular: Negative for chest pain, palpitations and leg swelling.   Gastrointestinal: Negative for abdominal distention, abdominal pain, blood in stool, constipation, diarrhea, nausea and vomiting.   Endocrine: Negative.    Genitourinary: Negative for dysuria and hematuria.   Musculoskeletal: Negative.    Neurological: Negative for dizziness, seizures, syncope, weakness and light-headedness.   Psychiatric/Behavioral: Negative.      Objective:     Vital Signs (Most Recent):  Temp: 98.1 °F (36.7 °C) (03/17/17 1200)  Pulse: 90 (03/17/17 1200)  Resp: (!) 21 (03/17/17 1200)  BP: 119/64 (03/17/17 1200)  SpO2: 97 % (03/17/17 1200) Vital Signs (24h Range):  Temp:  [97.7 °F (36.5 °C)-98.4 °F (36.9 °C)] 98.1 °F (36.7 °C)  Pulse:  [] 90  Resp:  [17-21] 21  SpO2:  [95 %-98 %] 97 %  BP: (119-137)/(64-79) 119/64     Weight: 76.4 kg (168 lb 6.4 oz)  Body mass index is 29.83 kg/(m^2).    Intake/Output Summary (Last 24 hours) at 03/17/17 1513  Last data filed at 03/17/17 1000   Gross per 24 hour   Intake              720 ml   Output              950 ml   Net             -230 ml      Physical Exam   Constitutional: She is oriented to person, place, and time. She appears well-developed and well-nourished. She appears distressed.   HENT:   Head: Normocephalic and atraumatic.   Right Ear: External ear normal.   Left Ear: External ear normal.   Nose: Nose normal.   Eyes: Right eye exhibits no discharge. Left eye exhibits no discharge.   Neck: Normal range of motion.   Cardiovascular: Normal rate, regular rhythm, normal heart sounds and  intact distal pulses.  Exam reveals no gallop and no friction rub.    No murmur heard.  Pulmonary/Chest:   Mildly increased work of breathing with diffuse and bilateral expiratory wheezing   Abdominal: Soft. Bowel sounds are normal. She exhibits no distension. There is no tenderness. There is no rebound and no guarding.   Musculoskeletal: Normal range of motion. She exhibits no edema.   Neurological: She is alert and oriented to person, place, and time.   Skin: Skin is warm and dry. She is not diaphoretic.   Psychiatric: She has a normal mood and affect. Her behavior is normal. Judgment and thought content normal.   Nursing note and vitals reviewed.      Significant Labs:   BMP:   Recent Labs  Lab 03/16/17  0257   GLU 98      K 3.5      CO2 33*   BUN 18   CREATININE 0.9   CALCIUM 9.5   MG 2.1     CBC:   Recent Labs  Lab 03/16/17  0257   WBC 5.81   HGB 12.7   HCT 38.8          Significant Imaging: I have reviewed all pertinent imaging results/findings within the past 24 hours.

## 2017-03-18 VITALS
BODY MASS INDEX: 29.84 KG/M2 | WEIGHT: 168.38 LBS | SYSTOLIC BLOOD PRESSURE: 138 MMHG | OXYGEN SATURATION: 98 % | HEART RATE: 97 BPM | TEMPERATURE: 98 F | HEIGHT: 63 IN | DIASTOLIC BLOOD PRESSURE: 75 MMHG | RESPIRATION RATE: 20 BRPM

## 2017-03-18 PROBLEM — R06.03 RESPIRATORY DISTRESS: Status: RESOLVED | Noted: 2017-03-16 | Resolved: 2017-03-18

## 2017-03-18 PROBLEM — I50.32 CHRONIC DIASTOLIC CHF (CONGESTIVE HEART FAILURE): Status: ACTIVE | Noted: 2017-03-18

## 2017-03-18 LAB — BNP SERPL-MCNC: 75 PG/ML

## 2017-03-18 PROCEDURE — 25000003 PHARM REV CODE 250: Performed by: HOSPITALIST

## 2017-03-18 PROCEDURE — 25000003 PHARM REV CODE 250: Performed by: EMERGENCY MEDICINE

## 2017-03-18 PROCEDURE — 25000003 PHARM REV CODE 250: Performed by: INTERNAL MEDICINE

## 2017-03-18 PROCEDURE — 94640 AIRWAY INHALATION TREATMENT: CPT

## 2017-03-18 PROCEDURE — 83880 ASSAY OF NATRIURETIC PEPTIDE: CPT

## 2017-03-18 PROCEDURE — 94668 MNPJ CHEST WALL SBSQ: CPT

## 2017-03-18 PROCEDURE — 63600175 PHARM REV CODE 636 W HCPCS: Performed by: INTERNAL MEDICINE

## 2017-03-18 PROCEDURE — 25000242 PHARM REV CODE 250 ALT 637 W/ HCPCS: Performed by: EMERGENCY MEDICINE

## 2017-03-18 PROCEDURE — 94761 N-INVAS EAR/PLS OXIMETRY MLT: CPT

## 2017-03-18 PROCEDURE — 27000221 HC OXYGEN, UP TO 24 HOURS

## 2017-03-18 PROCEDURE — 63600175 PHARM REV CODE 636 W HCPCS: Performed by: HOSPITALIST

## 2017-03-18 PROCEDURE — 36415 COLL VENOUS BLD VENIPUNCTURE: CPT

## 2017-03-18 RX ORDER — FLUTICASONE PROPIONATE 50 MCG
2 SPRAY, SUSPENSION (ML) NASAL DAILY
Qty: 3 G | Refills: 11 | Status: SHIPPED | OUTPATIENT
Start: 2017-03-18 | End: 2017-03-18

## 2017-03-18 RX ORDER — AMOXICILLIN AND CLAVULANATE POTASSIUM 875; 125 MG/1; MG/1
1 TABLET, FILM COATED ORAL 2 TIMES DAILY
Qty: 14 TABLET | Refills: 0 | Status: SHIPPED | OUTPATIENT
Start: 2017-03-18 | End: 2017-03-25

## 2017-03-18 RX ORDER — ACETYLCYSTEINE 200 MG/ML
2 SOLUTION ORAL; RESPIRATORY (INHALATION) 3 TIMES DAILY PRN
Qty: 15 VIAL | Refills: 11 | Status: SHIPPED | OUTPATIENT
Start: 2017-03-18 | End: 2017-04-17

## 2017-03-18 RX ORDER — FLUTICASONE PROPIONATE 50 MCG
2 SPRAY, SUSPENSION (ML) NASAL DAILY
Qty: 3 G | Refills: 11 | Status: SHIPPED | OUTPATIENT
Start: 2017-03-18 | End: 2017-04-17

## 2017-03-18 RX ORDER — FLUTICASONE PROPIONATE 50 MCG
2 SPRAY, SUSPENSION (ML) NASAL DAILY
Status: DISCONTINUED | OUTPATIENT
Start: 2017-03-18 | End: 2017-03-18 | Stop reason: HOSPADM

## 2017-03-18 RX ADMIN — IPRATROPIUM BROMIDE AND ALBUTEROL SULFATE 3 ML: .5; 3 SOLUTION RESPIRATORY (INHALATION) at 03:03

## 2017-03-18 RX ADMIN — IPRATROPIUM BROMIDE AND ALBUTEROL SULFATE 3 ML: .5; 3 SOLUTION RESPIRATORY (INHALATION) at 04:03

## 2017-03-18 RX ADMIN — Medication 12.5 MG: at 10:03

## 2017-03-18 RX ADMIN — METHYLPREDNISOLONE SODIUM SUCCINATE 40 MG: 125 INJECTION, POWDER, FOR SOLUTION INTRAMUSCULAR; INTRAVENOUS at 06:03

## 2017-03-18 RX ADMIN — FAMOTIDINE 20 MG: 20 TABLET, FILM COATED ORAL at 10:03

## 2017-03-18 RX ADMIN — FLUTICASONE PROPIONATE 2 SPRAY: 50 SPRAY, METERED NASAL at 10:03

## 2017-03-18 RX ADMIN — ENOXAPARIN SODIUM 40 MG: 100 INJECTION SUBCUTANEOUS at 04:03

## 2017-03-18 RX ADMIN — ACETYLCYSTEINE 2 ML: 200 INHALANT RESPIRATORY (INHALATION) at 04:03

## 2017-03-18 RX ADMIN — ROSUVASTATIN CALCIUM 20 MG: 10 TABLET, FILM COATED ORAL at 10:03

## 2017-03-18 RX ADMIN — POLYETHYLENE GLYCOL 3350 17 G: 17 POWDER, FOR SOLUTION ORAL at 10:03

## 2017-03-18 RX ADMIN — MONTELUKAST SODIUM 10 MG: 10 TABLET, FILM COATED ORAL at 10:03

## 2017-03-18 RX ADMIN — IPRATROPIUM BROMIDE AND ALBUTEROL SULFATE 3 ML: .5; 3 SOLUTION RESPIRATORY (INHALATION) at 08:03

## 2017-03-18 RX ADMIN — LEVOTHYROXINE SODIUM 50 MCG: 50 TABLET ORAL at 06:03

## 2017-03-18 RX ADMIN — ACETYLCYSTEINE 2 ML: 200 INHALANT RESPIRATORY (INHALATION) at 08:03

## 2017-03-18 RX ADMIN — IPRATROPIUM BROMIDE AND ALBUTEROL SULFATE 3 ML: .5; 3 SOLUTION RESPIRATORY (INHALATION) at 11:03

## 2017-03-18 RX ADMIN — MOXIFLOXACIN HYDROCHLORIDE 400 MG: 400 TABLET, FILM COATED ORAL at 10:03

## 2017-03-18 RX ADMIN — BUMETANIDE 1 MG: 1 TABLET ORAL at 10:03

## 2017-03-18 NOTE — NURSING
Patient in bed on phone awake and alert. Patient states no needs at this time. Will continue to monitor. Communication board up to date, bed in lowest position and call bell in reach.

## 2017-03-18 NOTE — PROGRESS NOTES
The pt was received on 3 liters nasal cannula with a sat of 96%. No distress was noted at this time. Her breath sounds are diminished. She tolerated her treatment with YANELI.

## 2017-03-18 NOTE — PLAN OF CARE
03/18/17 1648   Medicare Message   Important Message from Medicare regarding Discharge Appeal Rights Given to patient/caregiver;Explained to patient/caregiver;Signed/date by patient/caregiver

## 2017-03-18 NOTE — PROGRESS NOTES
The pt became short of breath after moving from the bed to the bedside toilet. Her sat dropped to 89% and her respirations were in the mi 20's. After her treatment her sat came up to 96% and her respirations were WNL.

## 2017-03-18 NOTE — PROGRESS NOTES
Provided patient with d/c instructions and patient verbalized understanding. Patient was wheeled off unit to car for discharge. NAD noted.

## 2017-03-18 NOTE — PROGRESS NOTES
Report received from SCARLETT Chaparro. Visualized patient and assessed patient's overall condition and appearance. No complaints. NAD noted. Will continue to monitor.

## 2017-03-18 NOTE — PLAN OF CARE
Problem: Fall Risk (Adult)  Goal: Identify Related Risk Factors and Signs and Symptoms  Related risk factors and signs and symptoms are identified upon initiation of Human Response Clinical Practice Guideline (CPG)   Outcome: Ongoing (interventions implemented as appropriate)    03/17/17 1925   Fall Risk   Related Risk Factors (Fall Risk) age-related changes;depression/anxiety;polypharmacy;sensory deficits   Signs and Symptoms (Fall Risk) presence of risk factors       Goal: Absence of Falls  Patient will demonstrate the desired outcomes by discharge/transition of care.   Outcome: Ongoing (interventions implemented as appropriate)    03/17/17 1925   Fall Risk (Adult)   Absence of Falls making progress toward outcome         Problem: Patient Care Overview  Goal: Plan of Care Review  Outcome: Ongoing (interventions implemented as appropriate)    03/17/17 1925   Coping/Psychosocial   Plan Of Care Reviewed With patient       Goal: Individualization & Mutuality  Outcome: Ongoing (interventions implemented as appropriate)    03/17/17 1925   Individualization   Patient Specific Preferences bedside commode   Patient Specific Goals wean steroids         Problem: Breathing Pattern Ineffective (Adult)  Goal: Identify Related Risk Factors and Signs and Symptoms  Related risk factors and signs and symptoms are identified upon initiation of Human Response Clinical Practice Guideline (CPG)   Outcome: Ongoing (interventions implemented as appropriate)    03/17/17 1925   Breathing Pattern Ineffective   Related Risk Factors (Breathing Pattern Ineffective) deconditioning;fatigue;underlying condition   Signs and Symptoms (Breathing Pattern Ineffective) accessory muscle use;activity intolerance;anxiousness;breath sounds abnormal;breathlessness;restlessness       Goal: Effective Oxygenation/Ventilation  Patient will demonstrate the desired outcomes by discharge/transition of care.   Outcome: Ongoing (interventions implemented as  appropriate)    03/17/17 1925   Breathing Pattern Ineffective (Adult)   Effective Oxygenation/Ventilation making progress toward outcome       Goal: Anxiety/Fear Reduction  Patient will demonstrate the desired outcomes by discharge/transition of care.   Outcome: Ongoing (interventions implemented as appropriate)    03/17/17 1925   Breathing Pattern Ineffective (Adult)   Anxiety/Fear Reduction making progress toward outcome         Problem: Pressure Ulcer Risk (Chris Scale) (Adult,Obstetrics,Pediatric)  Goal: Identify Related Risk Factors and Signs and Symptoms  Related risk factors and signs and symptoms are identified upon initiation of Human Response Clinical Practice Guideline (CPG)   Outcome: Ongoing (interventions implemented as appropriate)    03/17/17 1925   Pressure Ulcer Risk (Chris Scale)   Related Risk Factors (Pressure Ulcer Risk (Chris Scale)) infection;medical devices       Goal: Skin Integrity  Patient will demonstrate the desired outcomes by discharge/transition of care.   Outcome: Ongoing (interventions implemented as appropriate)    03/17/17 1925   Pressure Ulcer Risk (Chris Scale) (Adult,Obstetrics,Pediatric)   Skin Integrity making progress toward outcome

## 2017-04-02 NOTE — DISCHARGE SUMMARY
[facility] Hospital Medicine  Discharge Summary      Patient Name: Luz Baldwin  MRN: [id number]  Admission Date: 3/15/2017  Hospital Length of Stay: 3 days  Discharge Date and Time: 3/18/2017  Attending Physician: Karlie Andrade   Discharging Provider: Karlie Andrade MD  Primary Care Provider: Patrice Owens MD      HPI:   Mrs. Luz Baldwin is a 72 y.o. female with essential hypertension, hyperlipidemia (.0 Feb 2017), COPD, chronic respiratory failure with hypoxia, and hypothyroidism (TSH 4.618 Sept 2014) who presents to [facility] ED with complaints of worsening baseline dyspnea today.  She dyspnea is mainly on exertion and has severely limited her ability to ambulate.  She was previously able to ambulate but says it's been worse since her last hospitalization in Feb 2017.  The dyspnea is associated with a cough that is productive of light yellow sputum.  She has not had any fevers, chills, night sweats, weight loss, sick contacts, recent travel, chest pain, pleurisy, palpitations, hemoptysis, nor any lower extremity pain or swelling.  She also has had some wheezing and has been using her inhalers with good effect.      * No surgery found *      Indwelling Lines/Drains at time of discharge:   Lines/Drains/Airways          No matching active lines, drains, or airways        Hospital Course:   Pt admitted with COPD exacerbation on IV steroids and weaning of oxygen. She is on 2 liters NC at home and using more continuously. Sinus congestion and was started on antibiotic prior to admission. Completed antibiotics. + CONNELLY. ECHO reviewed from 2/2017 showed preserved EF 60% and diastolic dysfunction. Producing more with cough. Added acapella and mucomyst to respiratory treatments. Needs pulmonology follow up.        Pending Diagnostic Studies:     None        Final Active Diagnoses:    Diagnosis Date Noted POA    PRINCIPAL PROBLEM:  COPD with acute exacerbation [J44.1] 03/15/2017 Yes     Chronic diastolic CHF (congestive heart failure) [I50.32] 03/18/2017 Yes    Hyperlipidemia [E78.5] 03/15/2017 Yes     Chronic    Chronic respiratory failure with hypoxia [J96.11] 03/15/2017 Yes     Chronic    COPD (chronic obstructive pulmonary disease) [J44.9] 03/15/2017 Yes     Chronic    GERD (gastroesophageal reflux disease) [K21.9] 03/15/2017 Yes     Chronic    Essential hypertension [I10] 08/20/2016 Yes     Chronic    Hypothyroidism [E03.9] 08/20/2016 Yes     Chronic      Problems Resolved During this Admission:    Diagnosis Date Noted Date Resolved POA    Respiratory distress [R06.00] 03/16/2017 03/18/2017 Yes      * COPD with acute exacerbation  Patient was noted with an oxygen saturation of 80% on nasal cannula at 2 liters/minutes upon arrival of EMS at her home, which improved after a nebulized breathing treatment.  Her oxygen saturation here was as low as 90% on her home supplemental oxygen therapy.  I have reviewed the chest X-ray and it reveals a right pleural effusion but otherwise without infiltrates.  She appears to be improved at this time.  She has failed outpatient therapy.    Will continue on oral steroid taper, nebs, augmentin and followup with pulmonology for PFTS    Hypothyroidism  will continue patient's home regimen of levothyroxine.    Essential hypertension  Patient's blood pressure is well-controlled; will continue home regimen of bumetanide, isosorbide, and metoprolol    Hyperlipidemia  Poorly-controlled; will continue patient's home regimen of rosuvastatin.    Chronic respiratory failure with hypoxia  As addressed above.  Will need outpatient PFTS rescheduled     COPD (chronic obstructive pulmonary disease)  As addressed above.    GERD (gastroesophageal reflux disease)  Continue H2 blocker     Chronic diastolic CHF (congestive heart failure)  Resume diuretic  BP control    Respiratory distress, resolved as of 3/18/2017  Improving         Discharged Condition:  good    Disposition: Home or Self Care    Follow Up:  Follow-up Information     Schedule an appointment as soon as possible for a visit with Patrice Owens MD.    Specialty:  Internal Medicine    Why:  As needed    Contact information:    [address]  [place] [address] [phone]          Go to David Mcclain MD.    Specialty:  Pulmonary Disease    Why:  as scheduled for PFTs    Contact information:    [address] [place] [address] [address]  [place] [address] [phone]          Patient Instructions:     Diet Cardiac     Activity as tolerated     Call MD for:  temperature >100.4     Call MD for:  difficulty breathing or increased cough     Call MD for:  persistent dizziness, light-headedness, or visual disturbances       Medications:  Reconciled Home Medications:   Discharge Medication List as of 3/18/2017  4:36 PM      START taking these medications    Details   acetylcysteine 200 mg/ml, 20%, (MUCOMYST) 200 mg/mL (20 %) nebulizer solution Take 2 mLs by nebulization 3 (three) times daily as needed., Starting 3/18/2017, Until Mon 4/17/17, Print      amoxicillin-clavulanate 875-125mg (AUGMENTIN) 875-125 mg per tablet Take 1 tablet by mouth 2 (two) times daily., Starting 3/18/2017, Until Sat 3/25/17, Print         CONTINUE these medications which have CHANGED    Details   fluticasone (FLONASE) 50 mcg/actuation nasal spray 2 sprays by Each Nare route once daily., Starting 3/18/2017, Until Mon 4/17/17, Print         CONTINUE these medications which have NOT CHANGED    Details   ALBUTEROL INHL Inhale into the lungs., Until Discontinued, Historical Med      albuterol-ipratropium 2.5mg-0.5mg/3mL (DUO-NEB) 0.5 mg-3 mg(2.5 mg base)/3 mL nebulizer solution Take 3 mLs by nebulization every 4 (four) hours. Rescue, Starting 2/3/2017, Until Sat 2/3/18, Normal      bumetanide (BUMEX) 1 MG tablet Take 1 mg by mouth 2 (two) times daily. , Until Discontinued, Historical Med      isosorbide dinitrate (ISORDIL) 10 MG tablet Take  30 mg by mouth once daily. , Until Discontinued, Historical Med      levothyroxine (SYNTHROID) 75 MCG tablet Take 50 mcg by mouth once daily. , Until Discontinued, Historical Med      meclizine (ANTIVERT) 32 MG tablet Take 12.5 mg by mouth as needed., Until Discontinued, Historical Med      metoprolol tartrate (LOPRESSOR) 25 MG tablet Take 25 mg by mouth 2 (two) times daily., Until Discontinued, Historical Med      montelukast (SINGULAIR) 10 mg tablet Take 1 tablet (10 mg total) by mouth once daily., Starting 8/23/2016, Until Discontinued, Print      predniSONE (DELTASONE) 20 MG tablet Take 3 tabs for 2 days, then 2 tabs for 2 days, then 1 tab for 2 days, then stop., Print      ranitidine (ZANTAC) 300 MG capsule Take 300 mg by mouth every evening., Until Discontinued, Historical Med      rosuvastatin (CRESTOR) 10 MG tablet Take 20 mg by mouth once daily. , Until Discontinued, Historical Med         STOP taking these medications       doxycycline (VIBRA-TABS) 100 MG tablet Comments:   Reason for Stopping:             Time spent on the discharge of patient: 30 minutes    Karlie Andrade MD  Department of Hospital Medicine  [facility]

## 2017-04-02 NOTE — PROGRESS NOTES
[facility] Hospital Medicine  Progress Note    Patient Name: Luz Baldwin  MRN: [id number]  Patient Class: IP- Inpatient   Admission Date: 3/15/2017  Length of Stay: 3 days  Attending Physician: No att. providers found  Primary Care Provider: Patrice Owens MD        Subjective:     Principal Problem:COPD with acute exacerbation    HPI:  Mrs. Luz Baldwin is a 72 y.o. female with essential hypertension, hyperlipidemia (.0 Feb 2017), COPD, chronic respiratory failure with hypoxia, and hypothyroidism (TSH 4.618 Sept 2014) who presents to [facility] ED with complaints of worsening baseline dyspnea today.  She dyspnea is mainly on exertion and has severely limited her ability to ambulate.  She was previously able to ambulate but says it's been worse since her last hospitalization in Feb 2017.  The dyspnea is associated with a cough that is productive of light yellow sputum.  She has not had any fevers, chills, night sweats, weight loss, sick contacts, recent travel, chest pain, pleurisy, palpitations, hemoptysis, nor any lower extremity pain or swelling.  She also has had some wheezing and has been using her inhalers with good effect.      Hospital Course:  Pt admitted with COPD exacerbation on IV steroids and weaning of oxygen. She is on 2 liters NC at home and using more continuously. Sinus congestion and was started on antibiotic prior to admission. + CONNELLY. Producing more with cough. Added acapella and mucomyst to respiratory treatments.     Interval History: pt reports cough and starting to bring more mucous up, nasal sinuses less congested     Review of Systems   Constitutional: Negative for activity change, appetite change, chills, diaphoresis, fatigue, fever and unexpected weight change.   HENT: Negative.    Eyes: Negative.    Respiratory: Positive for cough, shortness of breath and wheezing. Negative for chest tightness.    Cardiovascular: Negative for chest pain,  palpitations and leg swelling.   Gastrointestinal: Negative for abdominal distention, abdominal pain, blood in stool, constipation, diarrhea, nausea and vomiting.   Endocrine: Negative.    Genitourinary: Negative for dysuria and hematuria.   Musculoskeletal: Negative.    Neurological: Negative for dizziness, seizures, syncope, weakness and light-headedness.   Psychiatric/Behavioral: Negative.      Objective:     Vital Signs (Most Recent):  Temp: 98.1 °F (36.7 °C) (03/17/17 1200)  Pulse: 90 (03/17/17 1200)  Resp: (!) 21 (03/17/17 1200)  BP: 119/64 (03/17/17 1200)  SpO2: 97 % (03/17/17 1200) Vital Signs (24h Range):  Temp:  [97.7 °F (36.5 °C)-98.4 °F (36.9 °C)] 98.1 °F (36.7 °C)  Pulse:  [] 90  Resp:  [17-21] 21  SpO2:  [95 %-98 %] 97 %  BP: (119-137)/(64-79) 119/64     Weight: 76.4 kg (168 lb 6.4 oz)  Body mass index is 29.83 kg/(m^2).    Intake/Output Summary (Last 24 hours) at 03/17/17 1513  Last data filed at 03/17/17 1000   Gross per 24 hour   Intake              720 ml   Output              950 ml   Net             -230 ml      Physical Exam   Constitutional: She is oriented to person, place, and time. She appears well-developed and well-nourished. She appears distressed.   HENT:   Head: Normocephalic and atraumatic.   Right Ear: External ear normal.   Left Ear: External ear normal.   Nose: Nose normal.   Eyes: Right eye exhibits no discharge. Left eye exhibits no discharge.   Neck: Normal range of motion.   Cardiovascular: Normal rate, regular rhythm, normal heart sounds and intact distal pulses.  Exam reveals no gallop and no friction rub.    No murmur heard.  Pulmonary/Chest:   Mildly increased work of breathing with diffuse and bilateral expiratory wheezing   Abdominal: Soft. Bowel sounds are normal. She exhibits no distension. There is no tenderness. There is no rebound and no guarding.   Musculoskeletal: Normal range of motion. She exhibits no edema.   Neurological: She is alert and oriented to  person, place, and time.   Skin: Skin is warm and dry. She is not diaphoretic.   Psychiatric: She has a normal mood and affect. Her behavior is normal. Judgment and thought content normal.   Nursing note and vitals reviewed.      Significant Labs:   BMP:   Recent Labs  Lab 03/16/17  0257   GLU 98      K 3.5      CO2 33*   BUN 18   CREATININE 0.9   CALCIUM 9.5   MG 2.1     CBC:   Recent Labs  Lab 03/16/17  0257   WBC 5.81   HGB 12.7   HCT 38.8          Significant Imaging: I have reviewed all pertinent imaging results/findings within the past 24 hours.    Assessment/Plan:      * COPD with acute exacerbation  Patient was noted with an oxygen saturation of 80% on nasal cannula at 2 liters/minutes upon arrival of EMS at her home, which improved after a nebulized breathing treatment.  Her oxygen saturation here was as low as 90% on her home supplemental oxygen therapy.  I have reviewed the chest X-ray and it reveals a right pleural effusion but otherwise without infiltrates.  She appears to be improved at this time.  She has failed outpatient therapy.  Will provide frequent nebulized ALEJANDRO/LAMA; intravenous corticosteroids; and empiric antibiotics.  Will continue supplemental oxygen. acapella and mucomyst to neb treatments    Hypothyroidism  Poorly-controlled; will continue patient's home regimen of levothyroxine.    Essential hypertension  Patient's blood pressure is well-controlled; will continue home regimen of bumetanide, isosorbide, and metoprolol, and provide as-needed clonidine.    Hyperlipidemia  Poorly-controlled; will continue patient's home regimen of rosuvastatin.    Chronic respiratory failure with hypoxia  As addressed above.  Will need outpatient PFTS rescheduled     COPD (chronic obstructive pulmonary disease)  As addressed above.    GERD (gastroesophageal reflux disease)  Will substitute her home regimen of ranitidine for famotidine while she is inpatient.          VTE Risk Mitigation          Ordered     Medium Risk of VTE  Once      03/15/17 0250          Karlie Andrade MD  Department of Hospital Medicine   [facility]